# Patient Record
Sex: MALE | Race: BLACK OR AFRICAN AMERICAN | NOT HISPANIC OR LATINO | Employment: STUDENT | URBAN - METROPOLITAN AREA
[De-identification: names, ages, dates, MRNs, and addresses within clinical notes are randomized per-mention and may not be internally consistent; named-entity substitution may affect disease eponyms.]

---
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
## 2023-02-18 ENCOUNTER — HOSPITAL ENCOUNTER (EMERGENCY)
Facility: HOSPITAL | Age: 18
Discharge: HOME | End: 2023-02-18
Attending: EMERGENCY MEDICINE
Payer: COMMERCIAL

## 2023-02-18 VITALS
TEMPERATURE: 99 F | OXYGEN SATURATION: 98 % | HEART RATE: 62 BPM | DIASTOLIC BLOOD PRESSURE: 56 MMHG | RESPIRATION RATE: 19 BRPM | SYSTOLIC BLOOD PRESSURE: 114 MMHG | WEIGHT: 164 LBS

## 2023-02-18 DIAGNOSIS — R56.9: ICD-10-CM

## 2023-02-18 DIAGNOSIS — R56.9 NEW ONSET SEIZURE: Primary | ICD-10-CM

## 2023-02-18 DIAGNOSIS — R56.9 GENERALIZED SEIZURE: ICD-10-CM

## 2023-02-18 DIAGNOSIS — E87.6 HYPOKALEMIA: ICD-10-CM

## 2023-02-18 DIAGNOSIS — Z72.820: ICD-10-CM

## 2023-02-18 LAB
ALBUMIN SERPL BCP-MCNC: 4.1 G/DL (ref 3.5–5.2)
ALP SERPL-CCNC: 67 U/L (ref 55–135)
ALT SERPL W/O P-5'-P-CCNC: 43 U/L (ref 10–44)
AMPHET+METHAMPHET UR QL: NEGATIVE
ANION GAP SERPL CALC-SCNC: 7 MMOL/L (ref 8–16)
AST SERPL-CCNC: 28 U/L (ref 10–40)
BACTERIA #/AREA URNS HPF: NEGATIVE /HPF
BARBITURATES UR QL SCN>200 NG/ML: NEGATIVE
BASOPHILS # BLD AUTO: 0.04 K/UL (ref 0–0.2)
BASOPHILS NFR BLD: 0.8 % (ref 0–1.9)
BENZODIAZ UR QL SCN>200 NG/ML: NEGATIVE
BILIRUB SERPL-MCNC: 1.1 MG/DL (ref 0.1–1)
BILIRUB UR QL STRIP: NEGATIVE
BUN SERPL-MCNC: <5 MG/DL (ref 6–20)
BZE UR QL SCN: NEGATIVE
CALCIUM SERPL-MCNC: 9.3 MG/DL (ref 8.7–10.5)
CANNABINOIDS UR QL SCN: NEGATIVE
CHLORIDE SERPL-SCNC: 104 MMOL/L (ref 95–110)
CLARITY UR: CLEAR
CO2 SERPL-SCNC: 27 MMOL/L (ref 23–29)
COLOR UR: YELLOW
CREAT SERPL-MCNC: 0.9 MG/DL (ref 0.5–1.4)
CREAT UR-MCNC: 222 MG/DL (ref 23–375)
DIFFERENTIAL METHOD: ABNORMAL
EOSINOPHIL # BLD AUTO: 0.2 K/UL (ref 0–0.5)
EOSINOPHIL NFR BLD: 3.6 % (ref 0–8)
ERYTHROCYTE [DISTWIDTH] IN BLOOD BY AUTOMATED COUNT: 11.3 % (ref 11.5–14.5)
EST. GFR  (NO RACE VARIABLE): >60 ML/MIN/1.73 M^2
GLUCOSE SERPL-MCNC: 109 MG/DL (ref 70–110)
GLUCOSE UR QL STRIP: NEGATIVE
HCT VFR BLD AUTO: 42.6 % (ref 40–54)
HGB BLD-MCNC: 14.6 G/DL (ref 14–18)
HGB UR QL STRIP: ABNORMAL
HYALINE CASTS #/AREA URNS LPF: 0 /LPF
IMM GRANULOCYTES # BLD AUTO: 0.01 K/UL (ref 0–0.04)
IMM GRANULOCYTES NFR BLD AUTO: 0.2 % (ref 0–0.5)
KETONES UR QL STRIP: ABNORMAL
LEUKOCYTE ESTERASE UR QL STRIP: NEGATIVE
LYMPHOCYTES # BLD AUTO: 1.7 K/UL (ref 1–4.8)
LYMPHOCYTES NFR BLD: 31.9 % (ref 18–48)
MAGNESIUM SERPL-MCNC: 2.4 MG/DL (ref 1.6–2.6)
MCH RBC QN AUTO: 32.9 PG (ref 27–31)
MCHC RBC AUTO-ENTMCNC: 34.3 G/DL (ref 32–36)
MCV RBC AUTO: 96 FL (ref 82–98)
MICROSCOPIC COMMENT: ABNORMAL
MONOCYTES # BLD AUTO: 0.5 K/UL (ref 0.3–1)
MONOCYTES NFR BLD: 8.7 % (ref 4–15)
NEUTROPHILS # BLD AUTO: 2.9 K/UL (ref 1.8–7.7)
NEUTROPHILS NFR BLD: 54.8 % (ref 38–73)
NITRITE UR QL STRIP: NEGATIVE
NRBC BLD-RTO: 0 /100 WBC
OPIATES UR QL SCN: NEGATIVE
PCP UR QL SCN>25 NG/ML: NEGATIVE
PH UR STRIP: 6 [PH] (ref 5–8)
PLATELET # BLD AUTO: 223 K/UL (ref 150–450)
PMV BLD AUTO: 10.1 FL (ref 9.2–12.9)
POTASSIUM SERPL-SCNC: 3.1 MMOL/L (ref 3.5–5.1)
PROT SERPL-MCNC: 7.2 G/DL (ref 6–8.4)
PROT UR QL STRIP: ABNORMAL
RBC # BLD AUTO: 4.44 M/UL (ref 4.6–6.2)
RBC #/AREA URNS HPF: 6 /HPF (ref 0–4)
SODIUM SERPL-SCNC: 138 MMOL/L (ref 136–145)
SP GR UR STRIP: 1.02 (ref 1–1.03)
SQUAMOUS #/AREA URNS HPF: 0 /HPF
TOXICOLOGY INFORMATION: NORMAL
URN SPEC COLLECT METH UR: ABNORMAL
UROBILINOGEN UR STRIP-ACNC: ABNORMAL EU/DL
WBC # BLD AUTO: 5.3 K/UL (ref 3.9–12.7)
WBC #/AREA URNS HPF: 1 /HPF (ref 0–5)

## 2023-02-18 PROCEDURE — 81001 URINALYSIS AUTO W/SCOPE: CPT | Mod: 59 | Performed by: EMERGENCY MEDICINE

## 2023-02-18 PROCEDURE — 83735 ASSAY OF MAGNESIUM: CPT | Performed by: EMERGENCY MEDICINE

## 2023-02-18 PROCEDURE — 25000003 PHARM REV CODE 250: Performed by: EMERGENCY MEDICINE

## 2023-02-18 PROCEDURE — 80053 COMPREHEN METABOLIC PANEL: CPT | Performed by: EMERGENCY MEDICINE

## 2023-02-18 PROCEDURE — 80307 DRUG TEST PRSMV CHEM ANLYZR: CPT | Performed by: EMERGENCY MEDICINE

## 2023-02-18 PROCEDURE — 85025 COMPLETE CBC W/AUTO DIFF WBC: CPT | Performed by: EMERGENCY MEDICINE

## 2023-02-18 PROCEDURE — 99284 EMERGENCY DEPT VISIT MOD MDM: CPT | Mod: 25

## 2023-02-18 RX ORDER — POTASSIUM CHLORIDE 20 MEQ/1
40 TABLET, EXTENDED RELEASE ORAL ONCE
Status: COMPLETED | OUTPATIENT
Start: 2023-02-18 | End: 2023-02-18

## 2023-02-18 RX ADMIN — POTASSIUM CHLORIDE 40 MEQ: 1500 TABLET, EXTENDED RELEASE ORAL at 09:02

## 2023-02-18 NOTE — DISCHARGE INSTRUCTIONS
Get adequate sleep.  Avoid antihistamines.  No alcohol.  Make an appointment to see the neurologist as soon as possible next week.  You will probably require an EEG which is a brain wave test.  If a seizure recurs return to the hospital immediately

## 2023-02-18 NOTE — ED PROVIDER NOTES
Encounter Date: 2/18/2023       History     Chief Complaint   Patient presents with    Seizures     Patient presents emergency department with reported seizure activity at home patient's mother reports that she heard a thump went to investigate found patient on floor of his room with seizure activity he was postictal after the seizure which mother reports lasted between 3 and 4 minutes she describes generalized tonic-clonic activity EMS arrival patient was postictal his mentation cleared in route to the emergency department after arrival emergency department states he is begun to have somewhat of a headache is not severe prior to onset of the symptoms there is no reported illness or injury no medications he is had no recent fever chills no nausea vomiting or diarrhea he has Vyvanse this is a medication with family reports he is taking no current medications      Review of patient's allergies indicates:  No Known Allergies  Past Medical History:   Diagnosis Date    ADHD (attention deficit hyperactivity disorder)      No past surgical history on file.  No family history on file.  Social History     Tobacco Use    Smoking status: Never   Substance Use Topics    Alcohol use: No    Drug use: No     Review of Systems   Constitutional:  Negative for chills and fever.   HENT:  Negative for congestion, ear pain and sore throat.    Respiratory:  Negative for cough and shortness of breath.    Cardiovascular:  Negative for chest pain and palpitations.   Gastrointestinal:  Negative for abdominal pain, diarrhea, nausea and vomiting.   Genitourinary:  Negative for dysuria.   Musculoskeletal:  Negative for neck pain.   Skin:  Negative for rash.   Neurological:  Positive for seizures and headaches. Negative for tremors, syncope, speech difficulty and weakness.   All other systems reviewed and are negative.    Physical Exam     Initial Vitals [02/18/23 0627]   BP Pulse Resp Temp SpO2   132/80 91 19 98.4 °F (36.9 °C) 98 %      MAP        --         Physical Exam    Constitutional: He appears well-developed and well-nourished. No distress.   HENT:   Head: Normocephalic and atraumatic.   Right Ear: External ear normal.   Left Ear: External ear normal.   Mouth/Throat: Oropharynx is clear and moist.   Eyes: EOM are normal. Pupils are equal, round, and reactive to light.   Neck: Neck supple.   Normal range of motion.  Cardiovascular:  Normal rate, regular rhythm, S1 normal, S2 normal, normal heart sounds and intact distal pulses.           Pulmonary/Chest: Breath sounds normal.   Abdominal: Abdomen is soft. Bowel sounds are normal. There is no abdominal tenderness.   Musculoskeletal:         General: Normal range of motion.      Cervical back: Normal range of motion and neck supple.     Neurological: He is alert and oriented to person, place, and time. He has normal strength. No cranial nerve deficit. GCS score is 15. GCS eye subscore is 4. GCS verbal subscore is 5. GCS motor subscore is 6.   Skin: Skin is warm and dry. Capillary refill takes less than 2 seconds. No rash noted.   Psychiatric: He has a normal mood and affect. His behavior is normal.       ED Course   Procedures  Labs Reviewed   CBC W/ AUTO DIFFERENTIAL - Abnormal; Notable for the following components:       Result Value    RBC 4.44 (*)     MCH 32.9 (*)     RDW 11.3 (*)     All other components within normal limits   COMPREHENSIVE METABOLIC PANEL - Abnormal; Notable for the following components:    Potassium 3.1 (*)     BUN <5 (*)     Total Bilirubin 1.1 (*)     Anion Gap 7 (*)     All other components within normal limits   URINALYSIS, REFLEX TO URINE CULTURE - Abnormal; Notable for the following components:    Protein, UA 1+ (*)     Ketones, UA Trace (*)     Occult Blood UA Trace (*)     Urobilinogen, UA 2.0-3.0 (*)     All other components within normal limits    Narrative:     Specimen Source->Urine   URINALYSIS MICROSCOPIC - Abnormal; Notable for the following components:    RBC, UA  6 (*)     Hyaline Casts, UA 0.00 (*)     All other components within normal limits    Narrative:     Specimen Source->Urine   MAGNESIUM   DRUG SCREEN PANEL, URINE EMERGENCY    Narrative:     Specimen Source->Urine          Imaging Results              CT Head Without Contrast (Final result)  Result time 02/18/23 07:45:39      Final result by Rick Mills MD (02/18/23 07:45:39)                   Narrative:    CMS MANDATED QUALITY DATA - CT RADIATION - 436    All CT scans at this facility utilize dose modulation, iterative reconstruction, and/or weight based dosing when appropriate to reduce radiation dose to as low as reasonably achievable.        Reason: Seizure, new-onset, no history of trauma    TECHNIQUE: Head CT without IV contrast.    COMPARISON: None    FINDINGS:    Gray-white differentiation is maintained without hemorrhage, midline shift, or mass effect.    The ventricles and cisterns are maintained.    Calvarium is intact. Mucous retention cyst versus polyp of the right maxillary sinus. Opacified left frontal sinus noted.    IMPRESSION:    No acute intracranial abnormality.  Right maxillary sinus mucus retention cyst versus polyp. Opacified left frontal sinus, possibly reflecting a mucous retention cyst or polyp.        Electronically signed by:  Rick Mills DO  2/18/2023 7:45 AM [facility] Workstation: [id number]                                     Medications   potassium chloride SA CR tablet 40 mEq (40 mEq Oral Given 2/18/23 0917)                Attending Attestation:             Attending ED Notes:   This 17-year-old male whose care was initially managed by Dr. Oh in whose care assumed at 7:10 a.m., this morning was found on the floor in his room having a generalized seizure.  The patient had no prior history of seizures.  There is no history of any drug or alcohol use.  No history any trauma.  No fever or chills.  The patient had no tongue biting or incontinence.  He is not taking any  over-the-counter antihistamines.  The patient did admit to me though that he only slept 2 hours last night.  There is no family history of seizures.  Screening labs reviewed and unremarkable including a CBC and chemistries.  Magnesium and calcium were normal.  Patient's urinalysis was unremarkable.  A CT scan of his head showed some evidence of a mucous retention cyst versus polyp in the right maxillary and left frontal sinus.  When reexamined the patient was awake alert and cooperative.  He denies any headache or any other complaints of pain.  He and his mother counseled that usually after the 1st generalized seizure, anticonvulsants or not begun however if he has another seizure been they will be started.  He is to follow-up with neurology and will undoubtedly require an EEG.  They are counseled to return to the emergency room if a recurrent seizure occurs.  Of note that previously mentioned the patient only had a slightly low potassium of 3.1 for which she is given 40 mEq of potassium in the ED.                 Clinical Impression:   Final diagnoses:  [R56.9] New onset seizure (Primary)  [R56.9] Generalized seizure  [R56.9, Z72.820] Sleep deprivation seizure  [E87.6] Hypokalemia        ED Disposition Condition    Discharge Stable          ED Prescriptions    None       Follow-up Information       Follow up With Specialties Details Why Contact Info    Carri Dickey MD Neurology Call in 5 days To schedule a follow-up appointment. [address] [place] [id number]  [phone]               Luis Fernandes Jr., MD  02/18/23 0879

## 2023-02-22 ENCOUNTER — OFFICE VISIT (OUTPATIENT)
Dept: NEUROLOGY | Facility: CLINIC | Age: 18
End: 2023-02-22
Payer: COMMERCIAL

## 2023-02-22 VITALS
WEIGHT: 167.25 LBS | HEART RATE: 58 BPM | DIASTOLIC BLOOD PRESSURE: 66 MMHG | BODY MASS INDEX: 23.41 KG/M2 | SYSTOLIC BLOOD PRESSURE: 109 MMHG | HEIGHT: 71 IN

## 2023-02-22 DIAGNOSIS — G40.909 SEIZURE DISORDER: Primary | ICD-10-CM

## 2023-02-22 PROBLEM — M41.125 ADOLESCENT IDIOPATHIC SCOLIOSIS OF THORACOLUMBAR SPINE: Status: ACTIVE | Noted: 2018-10-19

## 2023-02-22 PROBLEM — Z98.1 S/P SPINAL FUSION: Status: ACTIVE | Noted: 2018-10-19

## 2023-02-22 PROCEDURE — 3008F PR BODY MASS INDEX (BMI) DOCUMENTED: ICD-10-PCS | Mod: CPTII,S$GLB,, | Performed by: PSYCHIATRY & NEUROLOGY

## 2023-02-22 PROCEDURE — 3008F BODY MASS INDEX DOCD: CPT | Mod: CPTII,S$GLB,, | Performed by: PSYCHIATRY & NEUROLOGY

## 2023-02-22 PROCEDURE — 1160F PR REVIEW ALL MEDS BY PRESCRIBER/CLIN PHARMACIST DOCUMENTED: ICD-10-PCS | Mod: CPTII,S$GLB,, | Performed by: PSYCHIATRY & NEUROLOGY

## 2023-02-22 PROCEDURE — 3074F SYST BP LT 130 MM HG: CPT | Mod: CPTII,S$GLB,, | Performed by: PSYCHIATRY & NEUROLOGY

## 2023-02-22 PROCEDURE — 3074F PR MOST RECENT SYSTOLIC BLOOD PRESSURE < 130 MM HG: ICD-10-PCS | Mod: CPTII,S$GLB,, | Performed by: PSYCHIATRY & NEUROLOGY

## 2023-02-22 PROCEDURE — 1160F RVW MEDS BY RX/DR IN RCRD: CPT | Mod: CPTII,S$GLB,, | Performed by: PSYCHIATRY & NEUROLOGY

## 2023-02-22 PROCEDURE — 99203 PR OFFICE/OUTPT VISIT, NEW, LEVL III, 30-44 MIN: ICD-10-PCS | Mod: S$GLB,,, | Performed by: PSYCHIATRY & NEUROLOGY

## 2023-02-22 PROCEDURE — 1159F MED LIST DOCD IN RCRD: CPT | Mod: CPTII,S$GLB,, | Performed by: PSYCHIATRY & NEUROLOGY

## 2023-02-22 PROCEDURE — 1159F PR MEDICATION LIST DOCUMENTED IN MEDICAL RECORD: ICD-10-PCS | Mod: CPTII,S$GLB,, | Performed by: PSYCHIATRY & NEUROLOGY

## 2023-02-22 PROCEDURE — 99999 PR PBB SHADOW E&M-EST. PATIENT-LVL III: CPT | Mod: PBBFAC,,, | Performed by: PSYCHIATRY & NEUROLOGY

## 2023-02-22 PROCEDURE — 99999 PR PBB SHADOW E&M-EST. PATIENT-LVL III: ICD-10-PCS | Mod: PBBFAC,,, | Performed by: PSYCHIATRY & NEUROLOGY

## 2023-02-22 PROCEDURE — 99203 OFFICE O/P NEW LOW 30 MIN: CPT | Mod: S$GLB,,, | Performed by: PSYCHIATRY & NEUROLOGY

## 2023-02-22 PROCEDURE — 3078F DIAST BP <80 MM HG: CPT | Mod: CPTII,S$GLB,, | Performed by: PSYCHIATRY & NEUROLOGY

## 2023-02-22 PROCEDURE — 3078F PR MOST RECENT DIASTOLIC BLOOD PRESSURE < 80 MM HG: ICD-10-PCS | Mod: CPTII,S$GLB,, | Performed by: PSYCHIATRY & NEUROLOGY

## 2023-02-22 NOTE — LETTER
February 22, 2023      [place] [facility] Neurology  [address]  [place] [address]  Phone: [phone]  Fax: [phone]       Patient: Fernanda Leos   YOB: 2005  Date of Visit: 02/22/2023    To Whom It May Concern:    Fernanda Leos  was at [facility] on 02/22/2023. The patient may return to work/school on 2/23/2023 with no restrictions. If you have any questions or concerns, or if I can be of further assistance, please do not hesitate to contact me.    Sincerely,        Ella Perez MA

## 2023-02-22 NOTE — PROGRESS NOTES
[facility] NEUROLOGY  [facility], [place]    Date: 2/22/23  Patient Name: Fernanda Leos   MRN: [id number]   PCP: Primary Doctor No  Referring Provider: Self, Aaareferral    Chief Complaint: first time seizures   Subjective:      HPI:   Mr. Fernanda Leos is a 17 y.o. male with past history of ADHD and scoliosis (status post corrective surgery, fusion, T4-L4 instrumentation) presenting to discuss 1st ever seizure event.  Patient was at home on 02/18/23 when family member heard loud noise and went into his room.  Patient was observed with limbs extended and strange look on his face.  Foaming at the mouth jerking movements and irregular breathing.  No tongue biting.  No bowel/bladder incontinence. (+) postictal state for 15+ minutes.  Transported by ambulance to emergency department for evaluation where UDS, Mg, CBC, CMP, UA were largely unrevealing.  No further episodes since that time.    No family history of seizures.  No personal history of seizures.  No history of head trauma or neurological infections.  No history of significant childhood illness.    Patient takes no daily medications.  Denied poor sleep, drug/alcohol consumption, or exposure to sick in the 72 hrs before event.    PAST MEDICAL HISTORY:  Past Medical History:   Diagnosis Date    ADHD (attention deficit hyperactivity disorder)        PAST SURGICAL HISTORY:  History reviewed. No pertinent surgical history.    CURRENT MEDS:  No current outpatient medications on file.     No current facility-administered medications for this visit.       ALLERGIES:  Review of patient's allergies indicates:  No Known Allergies    FAMILY HISTORY:  History reviewed. No pertinent family history.    SOCIAL HISTORY:  Social History     Tobacco Use    Smoking status: Never   Substance Use Topics    Alcohol use: No    Drug use: No       Review of Systems:  Gen: no fever, no chills, no generalized feeling of weakness   HEENT: no double vision, no  "blurred vision, no eye pain, no eye exudates. no nasal congestion,   no traumatic injury of head, no neck pain, no neck stiffness. no photophobia or phonophobia at this time. ?    Heart: no chest pain, no SOB    Lungs: no SOB, no cough    MSK: no weakness of legs, intact ROM    ABD: no abd pain, no N/V/D/C, no difficulty with defecation.    Extremities: No leg pain, no edema.       Objective:     Vitals:    02/22/23 1006   BP: 109/66   BP Location: Left arm   Patient Position: Sitting   Pulse: (!) 58   Weight: 75.8 kg (167 lb 3.5 oz)   Height: 5' 11" (1.803 m)       General: male in NAD, alert and awake, Aox3, well groomed. ?    ? ?    HEENT: Head is NC/AT EOMI, pupil size: 4 mm B/L, no nystagmus noted; hearing grossly intact b/l. Mucous membrane moist, uvula midline, no pharyngeal erythema, exudates or discharges.      Neck: Supple. no nuchal rigidity.      Cardiovascular: well perfused, no cyanosis        Respiratory: Symmetric chest rise noted       Musculoskeletal: Muscle tone noted to be adequate for patient age, muscle mass is WNL. No spontaneous movements or fasciculations noted during this examination.       Extremities: No pedal edema or calf tenderness. No cogwheel rigidity noted on B/L UE extremities.       Neurological Examination.    Mental status: AA&O x3; Affect/mood is euthymic/congruent; no aphasia noted during examination. Patient answers simple questions appropriately & follows simple commands; no dysarthria or expressive aphasia; no harris-neglect or extinction. Vocabulary/word finding: excellent.       Cranial Nerves: II-XII grossly intact. visual fields intact to confrontation, EOMI, no nystagmus, PERRLA,?facial muscles symmetric and no facial droop noted, patient hearing is grossly intact b/l, ?facial sensation to sharp and light touch grossly intact B/L. Patient smiles, frowns, closes eyes ?forcefully uneventfully. Uvula midline and no difficulty with pronunciation. No SCM/trapezius/muscle of " "mastication weakness noted B/L. Patient has adequate control of tongue and may protrude it and move it adequately.       Muscle Function: Tone WNL and Muscle bulk WNL. Left elbow flexors/extensors (5/5), Left shoulder (5/5); left Finger flexor/extensors (5/5). Right elbow flexors/extensors (5/5). Right shoulder abduction/flexion (5/5), Right finger flexors/extensors (5/5). Left LE hip flexion/extension (5/5), Left Knee flexion/extension (5/5) and Left ankle flexion/extension/Eversion/inversion (5/5). Right LE hip flexion/extension (5/5), Right Knee flexion/extension (5/5) and ankle flexion/extension/Eversion/inversion (5/5).       Sensory: ?Pain/sharp, proprioception, and light touch are grossly intact in bilateral upper and lower extremities, face, and trunk.       Reflexes: Left and Right biceps, triceps, brachioradialis are 1+/4. patellar 2+/4 on Left and 2+/4 on Right, B/L Achilles 2+/4; Babinskis were down going B/L      Coordination: no dysmetria (finger to nose negative)     Gait: adequate casual gait with stride length and arm swing WNL. Tandem gait and walking on toes and heels are WNL     OTHER:  02/18/2023 CT head without contrast:  FINDINGS:  Gray-white differentiation is maintained without hemorrhage, midline shift, or mass effect.  The ventricles and cisterns are maintained.  Calvarium is intact. Mucous retention cyst versus polyp of the right maxillary sinus. Opacified left frontal sinus noted.  IMPRESSION:  No acute intracranial abnormality.  Right maxillary sinus mucus retention cyst versus polyp. Opacified left frontal sinus, possibly reflecting a mucous retention cyst or polyp."    Assessment:   Fernanda Leos is a 17 y.o. male presenting to discuss 1st ever seizure event.  Patient was described as suffering with a generalized tonic clonic seizure event followed by typical postictal confusion with no identifiable precipitating factors.  We had an extensive conversation regarding benefits, " risks, side effects, and other issues related to antiepileptics.  As this is a 1st ever event with normal exam and no concerning risk factors, we will defer antiepileptics for now.  We will initiate MRI brain with epilepsy protocol and EEG for further case definition.  Counseled extensively to reach out to our clinic immediately if further episodes occur or if other symptoms come to light.    Plan:     Problem List Items Addressed This Visit    None  Visit Diagnoses       Seizure disorder    -  Primary    Relevant Orders    MRI Brain Epilepsy Without Contrast    EEG,w/awake & drowsy record          - MRI brain with epilepsy protocol   - EEG   - no antiepileptic for now given 1st ever seizure event with no identifiable risk factors   - Seizure safety discussed extensively including avoidance of risky situations, large bodies of water, swimming alone, baths. We also discussed avoiding driving or operating other heavy machinery for 6 months after a breakthrough event in the [facility].   - Advised the patient to avoid seizure provoking behaviors including excessive alcohol consumption, sleep deprivation, and medication noncompliance.   - counseled that we would reach out via phone or chart message regarding results as they become available.  If no further breakthrough events or abnormal findings on testing, we will plan to follow up routinely in 6 months.    I spent a total of 30 minutes on the day of the visit. This includes face to face time and non-face to face time preparing to see the patient (eg, review of tests), obtaining and/or reviewing separately obtained history, documenting clinical information in the electronic or other health record, independently interpreting results and communicating results to the patient/family/caregiver, or care coordinator.    A dictation device was used to produce this document. Use of such devices sometimes results in grammatical errors or replacement of words that sound  similarly.    Paul Narayanan, DO

## 2023-03-02 ENCOUNTER — HOSPITAL ENCOUNTER (OUTPATIENT)
Dept: RADIOLOGY | Facility: HOSPITAL | Age: 18
Discharge: HOME | End: 2023-03-02
Attending: PSYCHIATRY & NEUROLOGY
Payer: COMMERCIAL

## 2023-03-02 ENCOUNTER — TELEPHONE (OUTPATIENT)
Dept: NEUROLOGY | Facility: CLINIC | Age: 18
End: 2023-03-02
Payer: MEDICAID

## 2023-03-02 DIAGNOSIS — G40.909 SEIZURE DISORDER: ICD-10-CM

## 2023-03-02 PROCEDURE — 70551 MRI BRAIN STEM W/O DYE: CPT | Mod: 26,,, | Performed by: RADIOLOGY

## 2023-03-02 PROCEDURE — 70551 MRI BRAIN STEM W/O DYE: CPT | Mod: TC

## 2023-03-02 PROCEDURE — 70551 MRI BRAIN EPILEPSY WITHOUT CONTRAST: ICD-10-PCS | Mod: 26,,, | Performed by: RADIOLOGY

## 2023-03-07 ENCOUNTER — HOSPITAL ENCOUNTER (OUTPATIENT)
Facility: HOSPITAL | Age: 18
Discharge: HOME | End: 2023-03-08
Attending: EMERGENCY MEDICINE | Admitting: HOSPITALIST
Payer: MEDICAID

## 2023-03-07 DIAGNOSIS — R07.9 CHEST PAIN: ICD-10-CM

## 2023-03-07 DIAGNOSIS — R56.9 SEIZURE: Primary | ICD-10-CM

## 2023-03-07 LAB
ALBUMIN SERPL BCP-MCNC: 4.2 G/DL (ref 3.5–5.2)
ALP SERPL-CCNC: 61 U/L (ref 55–135)
ALT SERPL W/O P-5'-P-CCNC: 47 U/L (ref 10–44)
AMPHET+METHAMPHET UR QL: NEGATIVE
ANION GAP SERPL CALC-SCNC: 7 MMOL/L (ref 8–16)
AST SERPL-CCNC: 28 U/L (ref 10–40)
BACTERIA #/AREA URNS HPF: NEGATIVE /HPF
BARBITURATES UR QL SCN>200 NG/ML: NEGATIVE
BASOPHILS # BLD AUTO: 0.04 K/UL (ref 0–0.2)
BASOPHILS NFR BLD: 0.7 % (ref 0–1.9)
BENZODIAZ UR QL SCN>200 NG/ML: NEGATIVE
BILIRUB SERPL-MCNC: 1.1 MG/DL (ref 0.1–1)
BILIRUB UR QL STRIP: NEGATIVE
BUN SERPL-MCNC: 11 MG/DL (ref 6–20)
BZE UR QL SCN: NEGATIVE
CALCIUM SERPL-MCNC: 9.2 MG/DL (ref 8.7–10.5)
CANNABINOIDS UR QL SCN: NEGATIVE
CHLORIDE SERPL-SCNC: 102 MMOL/L (ref 95–110)
CK SERPL-CCNC: 567 U/L (ref 20–200)
CLARITY UR: CLEAR
CO2 SERPL-SCNC: 26 MMOL/L (ref 23–29)
COLOR UR: YELLOW
CREAT SERPL-MCNC: 1 MG/DL (ref 0.5–1.4)
CREAT UR-MCNC: 145 MG/DL (ref 23–375)
DIFFERENTIAL METHOD: ABNORMAL
EOSINOPHIL # BLD AUTO: 0.3 K/UL (ref 0–0.5)
EOSINOPHIL NFR BLD: 5.3 % (ref 0–8)
ERYTHROCYTE [DISTWIDTH] IN BLOOD BY AUTOMATED COUNT: 11.5 % (ref 11.5–14.5)
EST. GFR  (NO RACE VARIABLE): >60 ML/MIN/1.73 M^2
GLUCOSE SERPL-MCNC: 96 MG/DL (ref 70–110)
GLUCOSE UR QL STRIP: NEGATIVE
HCT VFR BLD AUTO: 43.2 % (ref 40–54)
HGB BLD-MCNC: 14.5 G/DL (ref 14–18)
HGB UR QL STRIP: ABNORMAL
HYALINE CASTS #/AREA URNS LPF: 0 /LPF
IMM GRANULOCYTES # BLD AUTO: 0.02 K/UL (ref 0–0.04)
IMM GRANULOCYTES NFR BLD AUTO: 0.3 % (ref 0–0.5)
KETONES UR QL STRIP: ABNORMAL
LEUKOCYTE ESTERASE UR QL STRIP: NEGATIVE
LYMPHOCYTES # BLD AUTO: 1.6 K/UL (ref 1–4.8)
LYMPHOCYTES NFR BLD: 26.6 % (ref 18–48)
MCH RBC QN AUTO: 32.5 PG (ref 27–31)
MCHC RBC AUTO-ENTMCNC: 33.6 G/DL (ref 32–36)
MCV RBC AUTO: 97 FL (ref 82–98)
MICROSCOPIC COMMENT: NORMAL
MONOCYTES # BLD AUTO: 0.4 K/UL (ref 0.3–1)
MONOCYTES NFR BLD: 7.1 % (ref 4–15)
NEUTROPHILS # BLD AUTO: 3.6 K/UL (ref 1.8–7.7)
NEUTROPHILS NFR BLD: 60 % (ref 38–73)
NITRITE UR QL STRIP: NEGATIVE
NRBC BLD-RTO: 0 /100 WBC
OPIATES UR QL SCN: NEGATIVE
PCP UR QL SCN>25 NG/ML: NEGATIVE
PH UR STRIP: 6 [PH] (ref 5–8)
PLATELET # BLD AUTO: 272 K/UL (ref 150–450)
PMV BLD AUTO: 10.1 FL (ref 9.2–12.9)
POTASSIUM SERPL-SCNC: 3.6 MMOL/L (ref 3.5–5.1)
PROT SERPL-MCNC: 7 G/DL (ref 6–8.4)
PROT UR QL STRIP: ABNORMAL
RBC # BLD AUTO: 4.46 M/UL (ref 4.6–6.2)
RBC #/AREA URNS HPF: 2 /HPF (ref 0–4)
SODIUM SERPL-SCNC: 135 MMOL/L (ref 136–145)
SP GR UR STRIP: 1.02 (ref 1–1.03)
SQUAMOUS #/AREA URNS HPF: 0 /HPF
TOXICOLOGY INFORMATION: NORMAL
TSH SERPL DL<=0.005 MIU/L-ACNC: 1.43 UIU/ML (ref 0.34–5.6)
URN SPEC COLLECT METH UR: ABNORMAL
UROBILINOGEN UR STRIP-ACNC: NEGATIVE EU/DL
WBC # BLD AUTO: 6.06 K/UL (ref 3.9–12.7)
WBC #/AREA URNS HPF: 1 /HPF (ref 0–5)

## 2023-03-07 PROCEDURE — G0378 HOSPITAL OBSERVATION PER HR: HCPCS

## 2023-03-07 PROCEDURE — 80053 COMPREHEN METABOLIC PANEL: CPT | Performed by: EMERGENCY MEDICINE

## 2023-03-07 PROCEDURE — 93010 EKG 12-LEAD: ICD-10-PCS | Mod: ,,, | Performed by: INTERNAL MEDICINE

## 2023-03-07 PROCEDURE — 95819 EEG AWAKE AND ASLEEP: CPT

## 2023-03-07 PROCEDURE — 93005 ELECTROCARDIOGRAM TRACING: CPT | Performed by: INTERNAL MEDICINE

## 2023-03-07 PROCEDURE — 84443 ASSAY THYROID STIM HORMONE: CPT | Performed by: EMERGENCY MEDICINE

## 2023-03-07 PROCEDURE — 25000003 PHARM REV CODE 250: Performed by: EMERGENCY MEDICINE

## 2023-03-07 PROCEDURE — 80307 DRUG TEST PRSMV CHEM ANLYZR: CPT | Performed by: EMERGENCY MEDICINE

## 2023-03-07 PROCEDURE — 93010 ELECTROCARDIOGRAM REPORT: CPT | Mod: ,,, | Performed by: INTERNAL MEDICINE

## 2023-03-07 PROCEDURE — 82550 ASSAY OF CK (CPK): CPT | Performed by: EMERGENCY MEDICINE

## 2023-03-07 PROCEDURE — 63600175 PHARM REV CODE 636 W HCPCS: Performed by: HOSPITALIST

## 2023-03-07 PROCEDURE — 96365 THER/PROPH/DIAG IV INF INIT: CPT

## 2023-03-07 PROCEDURE — 81001 URINALYSIS AUTO W/SCOPE: CPT | Mod: 59 | Performed by: EMERGENCY MEDICINE

## 2023-03-07 PROCEDURE — 99285 EMERGENCY DEPT VISIT HI MDM: CPT | Mod: 25

## 2023-03-07 PROCEDURE — 85025 COMPLETE CBC W/AUTO DIFF WBC: CPT | Performed by: EMERGENCY MEDICINE

## 2023-03-07 PROCEDURE — 36415 COLL VENOUS BLD VENIPUNCTURE: CPT | Performed by: EMERGENCY MEDICINE

## 2023-03-07 RX ORDER — ONDANSETRON 2 MG/ML
4 INJECTION INTRAMUSCULAR; INTRAVENOUS EVERY 8 HOURS PRN
Status: DISCONTINUED | OUTPATIENT
Start: 2023-03-07 | End: 2023-03-08 | Stop reason: HOSPADM

## 2023-03-07 RX ORDER — LANOLIN ALCOHOL/MO/W.PET/CERES
800 CREAM (GRAM) TOPICAL
Status: DISCONTINUED | OUTPATIENT
Start: 2023-03-07 | End: 2023-03-08 | Stop reason: HOSPADM

## 2023-03-07 RX ORDER — GLUCAGON 1 MG
1 KIT INJECTION
Status: DISCONTINUED | OUTPATIENT
Start: 2023-03-07 | End: 2023-03-08 | Stop reason: HOSPADM

## 2023-03-07 RX ORDER — ACETAMINOPHEN 325 MG/1
650 TABLET ORAL EVERY 4 HOURS PRN
Status: DISCONTINUED | OUTPATIENT
Start: 2023-03-07 | End: 2023-03-08 | Stop reason: HOSPADM

## 2023-03-07 RX ORDER — NALOXONE HCL 0.4 MG/ML
0.02 VIAL (ML) INJECTION
Status: DISCONTINUED | OUTPATIENT
Start: 2023-03-07 | End: 2023-03-08 | Stop reason: HOSPADM

## 2023-03-07 RX ORDER — SODIUM,POTASSIUM PHOSPHATES 280-250MG
2 POWDER IN PACKET (EA) ORAL
Status: DISCONTINUED | OUTPATIENT
Start: 2023-03-07 | End: 2023-03-08 | Stop reason: HOSPADM

## 2023-03-07 RX ORDER — LEVETIRACETAM 500 MG/1
500 TABLET ORAL
Status: COMPLETED | OUTPATIENT
Start: 2023-03-07 | End: 2023-03-07

## 2023-03-07 RX ORDER — IBUPROFEN 200 MG
16 TABLET ORAL
Status: DISCONTINUED | OUTPATIENT
Start: 2023-03-07 | End: 2023-03-08 | Stop reason: HOSPADM

## 2023-03-07 RX ORDER — TALC
6 POWDER (GRAM) TOPICAL NIGHTLY PRN
Status: DISCONTINUED | OUTPATIENT
Start: 2023-03-07 | End: 2023-03-08 | Stop reason: HOSPADM

## 2023-03-07 RX ORDER — ACETAMINOPHEN 325 MG/1
650 TABLET ORAL EVERY 8 HOURS PRN
Status: DISCONTINUED | OUTPATIENT
Start: 2023-03-07 | End: 2023-03-08 | Stop reason: HOSPADM

## 2023-03-07 RX ORDER — LORAZEPAM 2 MG/ML
2 INJECTION INTRAMUSCULAR
Status: DISCONTINUED | OUTPATIENT
Start: 2023-03-07 | End: 2023-03-08 | Stop reason: HOSPADM

## 2023-03-07 RX ORDER — LEVETIRACETAM 5 MG/ML
500 INJECTION INTRAVASCULAR EVERY 12 HOURS
Status: DISCONTINUED | OUTPATIENT
Start: 2023-03-07 | End: 2023-03-08

## 2023-03-07 RX ORDER — POLYETHYLENE GLYCOL 3350 17 G/17G
17 POWDER, FOR SOLUTION ORAL 2 TIMES DAILY PRN
Status: DISCONTINUED | OUTPATIENT
Start: 2023-03-07 | End: 2023-03-08 | Stop reason: HOSPADM

## 2023-03-07 RX ORDER — SODIUM CHLORIDE 0.9 % (FLUSH) 0.9 %
10 SYRINGE (ML) INJECTION EVERY 12 HOURS PRN
Status: DISCONTINUED | OUTPATIENT
Start: 2023-03-07 | End: 2023-03-08 | Stop reason: HOSPADM

## 2023-03-07 RX ORDER — IBUPROFEN 200 MG
24 TABLET ORAL
Status: DISCONTINUED | OUTPATIENT
Start: 2023-03-07 | End: 2023-03-08 | Stop reason: HOSPADM

## 2023-03-07 RX ORDER — SIMETHICONE 80 MG
1 TABLET,CHEWABLE ORAL 4 TIMES DAILY PRN
Status: DISCONTINUED | OUTPATIENT
Start: 2023-03-07 | End: 2023-03-08 | Stop reason: HOSPADM

## 2023-03-07 RX ORDER — POLYETHYLENE GLYCOL 3350 17 G/17G
17 POWDER, FOR SOLUTION ORAL 2 TIMES DAILY
Status: DISCONTINUED | OUTPATIENT
Start: 2023-03-07 | End: 2023-03-07

## 2023-03-07 RX ADMIN — LEVETIRACETAM 500 MG: 5 INJECTION INTRAVENOUS at 08:03

## 2023-03-07 RX ADMIN — LEVETIRACETAM 500 MG: 500 TABLET, FILM COATED ORAL at 11:03

## 2023-03-07 NOTE — H&P
"[facility]  History & Physical    Patient Name: Fernanda Leos  MRN: [id number]  Patient Class: OP- Observation  Admission Date: 3/7/2023  Attending Physician: Lesly Salas MD   Primary Care Provider: Primary Doctor No         Patient information was obtained from patient, past medical records and ER records.     Subjective:     Principal Problem:Seizure    Chief Complaint:   Chief Complaint   Patient presents with    Seizures     Mom states pt had a seizure 15min ago that lasted approx  2 min. Mom states pt had a seizure 2weeks ago but has never had them before. Described as grand mal seizures. Pt A/Ox4        HPI: 17-year-old  gentleman with no significant prior history who recently had episode of seizure on 02/18 requiring ER visit came back with chief complaint of another episode of seizure today morning.  Upon further asking patient mentioned that he was in his usual state of health until today morning when family noticed tonic-clonic seizure however he denies any tongue biting or frothing from mouth.  He recently underwent CT head, MRI in his neurologist was planning EEG.  Otherwise denies any fever, chills, headache, dizziness, chest pain, palpitations, nausea, vomiting, bladder or bowel symptoms, denies taking any over-the-counter supplements, herbs.  Denies any smoking, excessive alcohol consumption or recreational drug use.  He does admit stress in the school due to "Grades".     Past medical history:  Negative except mentioned in HPI  Family history:  Denies any family history of seizure  Social history:  Denies any smoking, alcohol or recreational drug use  Surgical history:  Spine surgery      Past Medical History:   Diagnosis Date    ADHD (attention deficit hyperactivity disorder)        No past surgical history on file.    Review of patient's allergies indicates:  No Known Allergies    No current facility-administered medications on file prior to " encounter.     No current outpatient medications on file prior to encounter.     Family History    None       Tobacco Use    Smoking status: Never    Smokeless tobacco: Not on file   Substance and Sexual Activity    Alcohol use: No    Drug use: No    Sexual activity: Never     Review of Systems   Constitutional:  Negative for activity change and appetite change.   HENT:  Negative for congestion and sore throat.    Eyes:  Negative for discharge and visual disturbance.   Respiratory:  Negative for cough and chest tightness.    Cardiovascular:  Negative for chest pain and leg swelling.   Gastrointestinal:  Negative for abdominal pain and nausea.   Genitourinary:  Negative for dysuria and hematuria.   Musculoskeletal:  Negative for myalgias.   Skin:  Negative for pallor and rash.   Neurological:  Positive for seizures. Negative for dizziness and weakness.   Psychiatric/Behavioral:  Negative for behavioral problems. The patient is not nervous/anxious.    All other systems reviewed and are negative.  Objective:     Vital Signs (Most Recent):  Temp: 98.1 °F (36.7 °C) (03/07/23 0609)  Pulse: 63 (03/07/23 1159)  Resp: 16 (03/07/23 0609)  BP: (!) 108/58 (03/07/23 1159)  SpO2: 96 % (03/07/23 1159)   Vital Signs (24h Range):  Temp:  [98.1 °F (36.7 °C)] 98.1 °F (36.7 °C)  Pulse:  [63-91] 63  Resp:  [16] 16  SpO2:  [95 %-97 %] 96 %  BP: ()/(57-73) 108/58     Weight: 75.3 kg (166 lb)  Body mass index is 23.15 kg/m².    Physical Exam  Vitals and nursing note reviewed.   Constitutional:       Appearance: He is well-developed.   HENT:      Head: Atraumatic.   Neck:      Vascular: No JVD.   Cardiovascular:      Rate and Rhythm: Normal rate and regular rhythm.      Heart sounds: Normal heart sounds. No murmur heard.    No gallop.   Pulmonary:      Effort: No respiratory distress.      Breath sounds: Normal breath sounds. No wheezing.   Abdominal:      General: Bowel sounds are normal. There is no distension.      Palpations:  Abdomen is soft.      Tenderness: There is no guarding or rebound.   Musculoskeletal:      Cervical back: Neck supple.   Lymphadenopathy:      Cervical: No cervical adenopathy.   Skin:     General: Skin is warm.      Capillary Refill: Capillary refill takes less than 2 seconds.      Findings: No rash.   Neurological:      Mental Status: He is alert and oriented to person, place, and time.      Cranial Nerves: No cranial nerve deficit.   Psychiatric:         Behavior: Behavior normal.           Significant Labs: All pertinent labs within the past 24 hours have been reviewed.    Significant Imaging: I have reviewed all pertinent imaging results/findings within the past 24 hours.    Assessment/Plan:     17-year-old without any prior history presented with an episode of seizure, last episode of seizure was on 02/18 otherwise does not carry diagnosis of seizure disorder/epilepsy    Active Hospital Problems    Diagnosis  POA    *Seizure [R56.9]  Yes      Resolved Hospital Problems   No resolved problems to display.       Plan:  Admit to med surge-observation  Patient recently had seizure on 02/18.  CT head and MRI was negative recently, recently saw neurologist and was not placed on antiepileptics due to his 1st episode and normal neuro exam  ED provider discussed case with our Neurology team who recommended admission to hospital medicine and EEG  EEG ordered  Neurology consulted  Reviewed recent CT head as well as MRI  Received 500 mg IV Keppra in the ED, will continue 500 b.i.d.  Fall precautions, seizure precautions  P.r.n. Ativan for seizure  Further management as per clinical course and neurologist's  Recommendations  No family available at bedside during my interview    VTE Risk Mitigation (From admission, onward)    None             Lesly Salas MD  Department of Hospital Medicine   [facility]

## 2023-03-07 NOTE — CONSULTS
"[facility]  Department of Neurology  Neurology Consultation Note        PATIENT NAME: Fernanda Leos  MRN: [id number]  CSN: [id number]      TODAY'S DATE: 03/07/2023  ADMIT DATE: 3/7/2023                            CONSULTING PROVIDER: Pushpa Carroll NP  CONSULT REQUESTED BY: Lesly Salas MD      Reason for consult: Seizures       History obtained from chart review and the patient.    HPI per EMR: Fernanda Leos is a 17 y.o. male with no significant prior history who recently had episode of seizure on 02/18 requiring ER visit came back with chief complaint of another episode of seizure today morning.  Upon further asking patient mentioned that he was in his usual state of health until today morning when family noticed tonic-clonic seizure however he denies any tongue biting or frothing from mouth. He recently underwent CT head, MRI in his neurologist was planning EEG. Otherwise denies any fever, chills, headache, dizziness, chest pain, palpitations, nausea, vomiting, bladder or bowel symptoms, denies taking any over-the-counter supplements, herbs. Denies any smoking, excessive alcohol consumption or recreational drug use.  He does admit stress in the school due to "Grades".     Neurology consult: Pt seen and examined by me. In bed. Alert and oriented x3. Presented to the ED for complaints of seizure like activity. Described as convulsions that lasted for an unknown amount of time. Denies tongue biting and/ or incontinence. Post ictal state describes as fatigue and mild confusion/ brain fog. This is his second event. Currently being worked up in the clinic for seizures. Family hx of seizures (sister). Denies other events of seizure like activity including staring episodes.     PREVIOUS MEDICAL HISTORY:  Past Medical History:   Diagnosis Date    ADHD (attention deficit hyperactivity disorder)      PREVIOUS SURGICAL HISTORY:  No past surgical history on file.  FAMILY MEDICAL HISTORY:  No family " history on file.  SOCIAL HISTORY:  Social History     Tobacco Use    Smoking status: Never   Substance Use Topics    Alcohol use: No    Drug use: No     ALLERGIES:  Review of patient's allergies indicates:  No Known Allergies  HOME MEDICATIONS:  Prior to Admission medications    Not on File     CURRENT SCHEDULED MEDICATIONS:   levetiracetam IV  500 mg Intravenous Q12H    polyethylene glycol  17 g Oral BID     CURRENT INFUSIONS:    CURRENT PRN MEDICATIONS:  acetaminophen, acetaminophen, glucagon (human recombinant), glucose, glucose, lorazepam, magnesium oxide, magnesium oxide, melatonin, naloxone, ondansetron, potassium bicarbonate, potassium bicarbonate, potassium bicarbonate, potassium, sodium phosphates, potassium, sodium phosphates, potassium, sodium phosphates, simethicone, sodium chloride 0.9%    REVIEW OF SYSTEMS:  Please refer to the HPI for all pertinent positive and negative findings. A comprehensive review of all other systems was negative.       PHYSICAL EXAM:  Patient Vitals for the past 24 hrs:   BP Temp Temp src Pulse Resp SpO2 Weight   03/07/23 1646 125/77 98 °F (36.7 °C) Oral 80 18 (!) 3 % --   03/07/23 1246 132/75 98.2 °F (36.8 °C) Oral 79 18 97 % --   03/07/23 1159 (!) 108/58 -- -- 63 -- 96 % --   03/07/23 1129 106/60 -- -- 86 -- 97 % --   03/07/23 1059 (!) 97/57 -- -- 63 -- 95 % --   03/07/23 1030 119/70 -- -- 75 -- 97 % --   03/07/23 1000 110/68 -- -- 69 -- 95 % --   03/07/23 0931 104/60 -- -- 67 -- 97 % --   03/07/23 0759 119/69 -- -- 82 -- 97 % --   03/07/23 0659 118/65 -- -- 73 -- 95 % --   03/07/23 0648 (!) 113/57 -- -- 77 -- 95 % --   03/07/23 0609 121/73 98.1 °F (36.7 °C) Oral 91 16 95 % 75.3 kg (166 lb)       GENERAL APPEARANCE: Drowsy, well-developed, well-nourished male in no acute distress.  HEENT: Normocephalic and atraumatic. PERRL. Oropharynx unremarkable.  PULM: Normal respiratory effort. No accessory muscle use.  CV: RRR.  ABDOMEN: Soft, nontender.  EXTREMITIES: No obvious signs  of vascular compromise. Pulses present. No cyanosis, clubbing or edema.  SKIN: Clear; no rashes, lesions or skin breaks in exposed areas.    NEURO:  MENTAL STATUS: Patient awake and oriented to time, place, and person, recent/remote memory normal, attention span/concentration normal, speech fluent without paraphasic errors, good comprehension with appropriate thought content, and fund of knowledge appropriate for patient's level of education.  Affect euthymic.    CRANIAL NERVES:  CN I: Not tested.  CN II: Fundoscopic exam deferred.  CN III, IV, VI: Pupils equal, round and reactive to light.  Extraocular movements full and intact.  CN V: Facial sensation normal.  CN VII: Facial asymmetry absent.  CN VIII: Hearing grossly normal and equal bilaterally.  No skew deviation or pathologic nystagmus.  CN IX, X: Palate elevates symmetrically. Speech/articulation is clear without dysarthria.  CN XI: Shoulder shrug and chin rotation equal with good strength.  CN XII: Tongue protrusion midline.    MOTOR:  Bulk normal. Tone normal and symmetric throughout.  Abnormal movements absent.  Tremor: none present.  Strength  4/5 .    REFLEXES:  DTRs 2+ throughout.  Plantar response not tested.  SENSATION: grossly intact throughout.  COORDINATION: normal finger-to-nose.  STATION: not tested.  GAIT: not tested.      Labs:  Recent Labs   Lab 03/07/23  0713   *   K 3.6      CO2 26   BUN 11   CREATININE 1.0   GLU 96   CALCIUM 9.2     Recent Labs   Lab 03/07/23  0713   WBC 6.06   HGB 14.5   HCT 43.2        Recent Labs   Lab 03/07/23 0713   ALBUMIN 4.2   PROT 7.0   BILITOT 1.1*   ALKPHOS 61   ALT 47*   AST 28     No results found for: PT, PTT, INR  No results found for: CHOLTOT, TRIG, HDL, CHOLHDL, LDL  No results found for: HGBA1C  No results found for: PROTEINCSF, GLUCCSF, WBCCSF    Imaging:  I have reviewed and interpreted the pertinent imaging and lab results.      X-Ray Chest AP Portable  Reason:  Seizure.    FINDINGS:    Portable chest without comparisons show normal cardiomediastinal silhouette.  Lungs are clear. Pulmonary vasculature is normal. No acute osseous abnormality. Dextrocurvature of the thoracic spine with Sharp rods.    IMPRESSION:    No acute cardiopulmonary abnormality.    Electronically signed by:  Rick Mills DO  3/7/2023 8:16 AM CST Workstation: [id number]  CT Head Without Contrast  CMS MANDATED QUALITY DATA - CT RADIATION - 436    All CT scans at this facility utilize dose modulation, iterative reconstruction, and/or weight based dosing when appropriate to reduce radiation dose to as low as reasonably achievable.    Reason: Seizure, new-onset, no history of trauma    TECHNIQUE: Head CT without IV contrast.    COMPARISON: CT brain February 18, 2023    FINDINGS:    Gray-white differentiation is maintained without hemorrhage, midline shift, or mass effect.    The ventricles and cisterns are maintained.    Calvarium is intact. Right maxillary sinus mucus retention cyst versus polyp and mucoperiosteal thickening of the left frontal sinus again noted.    IMPRESSION:    No acute intracranial abnormality.    Electronically signed by:  Rick Mills DO  3/7/2023 8:15 AM CST Workstation: [id number]         ASSESSMENT & PLAN:    Seizures    CTH- No acute intracranial abnormality.  Previous MRI brain wo- Negative noncontrast MRI of the brain with special attention to the mesial temporal lobes    PLAN  - Routine EEG pending   - Continue Keppra 500mg BID for now  - OK with PRN ativan for seizures   - Medication s/e discussed with patient  - Seizure education and precautions discussed with patient  - Seizures precautions while inpatient   - Monitor for 24 hours for seizure events and complete EEG  - Follow up with Neurocare within 2 weeks of discharge       Seizure education discussed with patient .      No driving for now, no swimming alone, no climbing high areas, no operation of heavy  machinery or working with high risk electricity equipment.    Continue to take AEDs as prescribed. If any major side effects from neurological medications go to the ED including mood changes and severe depression.  Patient and/or next of kin informed.  Follow up Neurology in 2 weeks. Call [phone] to make an appointment.    Medication side effects discussed with the patient and/or caregiver.        Thank you kindly for including us in the care of this patient. Please do not hesitate to contact us with any questions.      50 minutes of care time has been spent evaluating with the patient. Time includes chart review not limited to diagnostic imaging, labs, and vitals, patient assessment, discussion with family and nursing, current order evaluations, and new order entries.       Pushpa Carroll NP  Neurology/vascular Neurology  Date of Service: 03/07/2023  4:56 PM    --------------------------------------------------------------------------------------------------------------------------------------------------------------------------------------------------------------------------------------------------------------  Please note: This note was transcribed using voice recognition software. Because of this technology there are often uinintended grammatical, spelling, and other transcription errors. Please disregard these errors.

## 2023-03-07 NOTE — SUBJECTIVE & OBJECTIVE
Past Medical History:   Diagnosis Date    ADHD (attention deficit hyperactivity disorder)        No past surgical history on file.    Review of patient's allergies indicates:  No Known Allergies    No current facility-administered medications on file prior to encounter.     No current outpatient medications on file prior to encounter.     Family History    None       Tobacco Use    Smoking status: Never    Smokeless tobacco: Not on file   Substance and Sexual Activity    Alcohol use: No    Drug use: No    Sexual activity: Never     Review of Systems   Constitutional:  Negative for activity change and appetite change.   HENT:  Negative for congestion and sore throat.    Eyes:  Negative for discharge and visual disturbance.   Respiratory:  Negative for cough and chest tightness.    Cardiovascular:  Negative for chest pain and leg swelling.   Gastrointestinal:  Negative for abdominal pain and nausea.   Genitourinary:  Negative for dysuria and hematuria.   Musculoskeletal:  Negative for myalgias.   Skin:  Negative for pallor and rash.   Neurological:  Positive for seizures. Negative for dizziness and weakness.   Psychiatric/Behavioral:  Negative for behavioral problems. The patient is not nervous/anxious.    All other systems reviewed and are negative.  Objective:     Vital Signs (Most Recent):  Temp: 98.1 °F (36.7 °C) (03/07/23 0609)  Pulse: 63 (03/07/23 1159)  Resp: 16 (03/07/23 0609)  BP: (!) 108/58 (03/07/23 1159)  SpO2: 96 % (03/07/23 1159)   Vital Signs (24h Range):  Temp:  [98.1 °F (36.7 °C)] 98.1 °F (36.7 °C)  Pulse:  [63-91] 63  Resp:  [16] 16  SpO2:  [95 %-97 %] 96 %  BP: ()/(57-73) 108/58     Weight: 75.3 kg (166 lb)  Body mass index is 23.15 kg/m².    Physical Exam  Vitals and nursing note reviewed.   Constitutional:       Appearance: He is well-developed.   HENT:      Head: Atraumatic.   Neck:      Vascular: No JVD.   Cardiovascular:      Rate and Rhythm: Normal rate and regular rhythm.      Heart  sounds: Normal heart sounds. No murmur heard.    No gallop.   Pulmonary:      Effort: No respiratory distress.      Breath sounds: Normal breath sounds. No wheezing.   Abdominal:      General: Bowel sounds are normal. There is no distension.      Palpations: Abdomen is soft.      Tenderness: There is no guarding or rebound.   Musculoskeletal:      Cervical back: Neck supple.   Lymphadenopathy:      Cervical: No cervical adenopathy.   Skin:     General: Skin is warm.      Capillary Refill: Capillary refill takes less than 2 seconds.      Findings: No rash.   Neurological:      Mental Status: He is alert and oriented to person, place, and time.      Cranial Nerves: No cranial nerve deficit.   Psychiatric:         Behavior: Behavior normal.           Significant Labs: All pertinent labs within the past 24 hours have been reviewed.    Significant Imaging: I have reviewed all pertinent imaging results/findings within the past 24 hours.

## 2023-03-07 NOTE — HPI
"17-year-old  gentleman with no significant prior history who recently had episode of seizure on 02/18 requiring ER visit came back with chief complaint of another episode of seizure today morning.  Upon further asking patient mentioned that he was in his usual state of health until today morning when family noticed tonic-clonic seizure however he denies any tongue biting or frothing from mouth.  He recently underwent CT head, MRI in his neurologist was planning EEG.  Otherwise denies any fever, chills, headache, dizziness, chest pain, palpitations, nausea, vomiting, bladder or bowel symptoms, denies taking any over-the-counter supplements, herbs.  Denies any smoking, excessive alcohol consumption or recreational drug use.  He does admit stress in the school due to "Grades".     Past medical history:  Negative except mentioned in HPI  Family history:  Denies any family history of seizure  Social history:  Denies any smoking, alcohol or recreational drug use  Surgical history:  Spine surgery  "

## 2023-03-07 NOTE — LETTER
March 8, 2023         [address]  [facility] [address]  Phone: [phone]  Fax: [phone]       Patient: Fernanda Leos   YOB: 2005  Date of Visit: 03/08/2023    To Whom It May Concern:    aFisal Leos  was at [facility] on 3/7/2023. The patient may return to work/school on 3/9/2023 with activity as tolerated with no driving or swimming restriction. If you have any questions or concerns, or if I can be of further assistance, please do not hesitate to contact me.    Sincerely,    Ioana Gómez RN

## 2023-03-08 VITALS
DIASTOLIC BLOOD PRESSURE: 75 MMHG | WEIGHT: 172.38 LBS | OXYGEN SATURATION: 98 % | HEART RATE: 60 BPM | RESPIRATION RATE: 16 BRPM | SYSTOLIC BLOOD PRESSURE: 122 MMHG | BODY MASS INDEX: 24.13 KG/M2 | HEIGHT: 71 IN | TEMPERATURE: 98 F

## 2023-03-08 LAB
ALBUMIN SERPL BCP-MCNC: 4.4 G/DL (ref 3.5–5.2)
ALP SERPL-CCNC: 73 U/L (ref 55–135)
ALT SERPL W/O P-5'-P-CCNC: 45 U/L (ref 10–44)
ANION GAP SERPL CALC-SCNC: 5 MMOL/L (ref 8–16)
AST SERPL-CCNC: 28 U/L (ref 10–40)
BASOPHILS # BLD AUTO: 0.06 K/UL (ref 0–0.2)
BASOPHILS NFR BLD: 1 % (ref 0–1.9)
BILIRUB SERPL-MCNC: 1 MG/DL (ref 0.1–1)
BUN SERPL-MCNC: 11 MG/DL (ref 6–20)
CALCIUM SERPL-MCNC: 9.5 MG/DL (ref 8.7–10.5)
CHLORIDE SERPL-SCNC: 107 MMOL/L (ref 95–110)
CO2 SERPL-SCNC: 27 MMOL/L (ref 23–29)
CREAT SERPL-MCNC: 0.9 MG/DL (ref 0.5–1.4)
DIFFERENTIAL METHOD: ABNORMAL
EOSINOPHIL # BLD AUTO: 0.5 K/UL (ref 0–0.5)
EOSINOPHIL NFR BLD: 8.2 % (ref 0–8)
ERYTHROCYTE [DISTWIDTH] IN BLOOD BY AUTOMATED COUNT: 11.3 % (ref 11.5–14.5)
EST. GFR  (NO RACE VARIABLE): >60 ML/MIN/1.73 M^2
GLUCOSE SERPL-MCNC: 108 MG/DL (ref 70–110)
HCT VFR BLD AUTO: 43.2 % (ref 40–54)
HGB BLD-MCNC: 14.8 G/DL (ref 14–18)
IMM GRANULOCYTES # BLD AUTO: 0.01 K/UL (ref 0–0.04)
IMM GRANULOCYTES NFR BLD AUTO: 0.2 % (ref 0–0.5)
LYMPHOCYTES # BLD AUTO: 3 K/UL (ref 1–4.8)
LYMPHOCYTES NFR BLD: 52.1 % (ref 18–48)
MAGNESIUM SERPL-MCNC: 2.1 MG/DL (ref 1.6–2.6)
MCH RBC QN AUTO: 33 PG (ref 27–31)
MCHC RBC AUTO-ENTMCNC: 34.3 G/DL (ref 32–36)
MCV RBC AUTO: 96 FL (ref 82–98)
MONOCYTES # BLD AUTO: 0.5 K/UL (ref 0.3–1)
MONOCYTES NFR BLD: 7.9 % (ref 4–15)
NEUTROPHILS # BLD AUTO: 1.8 K/UL (ref 1.8–7.7)
NEUTROPHILS NFR BLD: 30.6 % (ref 38–73)
NRBC BLD-RTO: 0 /100 WBC
PHOSPHATE SERPL-MCNC: 4.2 MG/DL (ref 2.7–4.5)
PLATELET # BLD AUTO: 294 K/UL (ref 150–450)
PMV BLD AUTO: 10 FL (ref 9.2–12.9)
POTASSIUM SERPL-SCNC: 3.8 MMOL/L (ref 3.5–5.1)
PROT SERPL-MCNC: 7.2 G/DL (ref 6–8.4)
RBC # BLD AUTO: 4.49 M/UL (ref 4.6–6.2)
SODIUM SERPL-SCNC: 139 MMOL/L (ref 136–145)
WBC # BLD AUTO: 5.82 K/UL (ref 3.9–12.7)

## 2023-03-08 PROCEDURE — 83735 ASSAY OF MAGNESIUM: CPT | Performed by: HOSPITALIST

## 2023-03-08 PROCEDURE — G0378 HOSPITAL OBSERVATION PER HR: HCPCS

## 2023-03-08 PROCEDURE — 36415 COLL VENOUS BLD VENIPUNCTURE: CPT | Performed by: HOSPITALIST

## 2023-03-08 PROCEDURE — 80053 COMPREHEN METABOLIC PANEL: CPT | Performed by: HOSPITALIST

## 2023-03-08 PROCEDURE — 84100 ASSAY OF PHOSPHORUS: CPT | Performed by: HOSPITALIST

## 2023-03-08 PROCEDURE — 25000003 PHARM REV CODE 250: Performed by: INTERNAL MEDICINE

## 2023-03-08 PROCEDURE — 85025 COMPLETE CBC W/AUTO DIFF WBC: CPT | Performed by: HOSPITALIST

## 2023-03-08 RX ORDER — LEVETIRACETAM 500 MG/1
500 TABLET ORAL 2 TIMES DAILY
Status: DISCONTINUED | OUTPATIENT
Start: 2023-03-08 | End: 2023-03-08 | Stop reason: HOSPADM

## 2023-03-08 RX ORDER — LEVETIRACETAM 500 MG/1
500 TABLET ORAL 2 TIMES DAILY
Qty: 60 TABLET | Refills: 0 | Status: SHIPPED | OUTPATIENT
Start: 2023-03-08 | End: 2023-03-17

## 2023-03-08 RX ADMIN — LEVETIRACETAM 500 MG: 500 TABLET, FILM COATED ORAL at 08:03

## 2023-03-08 NOTE — PLAN OF CARE
Discharge orders and chart reviewed with no further post-acute discharge needs identified at this time.  At this time, patient is cleared for discharge from Case Management standpoint.     Patient to transport home via private vehicle with family.       03/08/23 0918   Final Note   Assessment Type Final Discharge Note   Anticipated Discharge Disposition Home   Hospital Resources/Appts/Education Provided   (patient instructed to search the [employer] website and choose a PCP to establish care with, and schedule a post hospital follow up appointment within 7 to 10 days of discharge.)   Post-Acute Status   Coverage [employer] PPO   Discharge Delays None known at this time

## 2023-03-08 NOTE — PLAN OF CARE
Met with patient at bedside to complete initial assessment. Patient / family reports patient DOES NOT have a living will and NO ONE is medical POA.      [facility]  Initial Discharge Assessment       Primary Care Provider: Primary Doctor No    Admission Diagnosis: Seizure [R56.9]    Admission Date: 3/7/2023  Expected Discharge Date: 3/8/2023    Discharge Barriers Identified: (P) None    Payor: HUMANA / Plan: HUMANA  PPO / Product Type: PPO /     Extended Emergency Contact Information  Primary Emergency Contact: Alexandra Leos  Address: [address] [place] [address] [id number] [place] [address]  Home Phone: [phone]  Mobile Phone: [phone]  Relation: Mother    Discharge Plan A: (P) Home with family  Discharge Plan B: (P) Home with family      [id number] - [place], [place] - [address] AT [facility] [address] & [facility]  [address] [facility] [address]  Phone: [phone] Fax: [phone]    [id number] - [place] - [address] AT [facility] OF [address]  [address] [place] [address]  Phone: [phone] Fax: [phone]      Initial Assessment (most recent)       Adult Discharge Assessment - 03/08/23 0916          Discharge Assessment    Assessment Type Discharge Planning Assessment (P)      Confirmed/corrected address, phone number and insurance Yes (P)      Confirmed Demographics Correct on Facesheet (P)      Source of Information patient (P)      Communicated ELINOR with patient/caregiver Yes (P)      Reason For Admission seizure (P)      People in Home parent(s) (P)      Facility Arrived From: home (P)      Do you expect to return to your current living situation? Yes (P)      Do you have help at home or someone to help you manage your care at home? Yes (P)      Who are your caregiver(s) and their phone number(s)? Alexandra Leos (Mother)   [phone] (Mobile) (P)      Current cognitive status: Alert/Oriented (P)       Equipment Currently Used at Home none (P)      Readmission within 30 days? No (P)      Patient currently being followed by outpatient case management? No (P)      Do you currently have service(s) that help you manage your care at home? No (P)      Do you take prescription medications? No (P)      Do you have prescription coverage? Yes (P)      Coverage [employer] PPO (P)      Who is going to help you get home at discharge? Alexandra Leos (Mother)   [phone] (Mobile) (P)      How do you get to doctors appointments? car, drives self (P)      Are you on dialysis? No (P)      Do you take coumadin? No (P)      Discharge Plan A Home with family (P)      Discharge Plan B Home with family (P)      DME Needed Upon Discharge  none (P)      Discharge Plan discussed with: Patient (P)      Discharge Barriers Identified None (P)         OTHER    Name(s) of People in Home Alexandra Leos (Mother)   [phone] (Mobile) (P)

## 2023-03-08 NOTE — PROCEDURES
REASON FOR STUDY: Seizures  DATE OF STUDY: 3/7/2023  PHYSICIAN REQUESTING/INSTITUTION: Dr. Dotson.      Methods: This 25-minute EEG was performed with electrodes placement according to the 10/20 international system. Video was added.   EEG FINDINGS:  Background activity: The background activity consisted of posterior dominant rhythm of 7 Hz with minimal reactivity.   Drowsiness: Present.  Sleep: Present.  Photic stimulation and hyperventilation were performed with no associated changes. EEG abnormalities: 1- Generalized theta activity 2- Slow posterior dominant rhythm      IMPRESSION: Abnormal routine EEG study indicating a mild encephalopathy. No interictal epileptiform discharges and no seizures were recorded.  --    Alonzo Burnham MD, FAAN.  Board Certified in Neurology, Vascular Neurology, and Brain Injury Medicine           Alonzo Burnham MD. FAAN.  [facility]  [phone]

## 2023-03-08 NOTE — HOSPITAL COURSE
Assumed care of this patient on 3/8/23.  Patient states he was sleeping, family noted seizure-like activity.  Reportedly he had brain fog/postictal state.  This is his second episode of seizure activity.  States he was being evaluated by a neurologist in [place], had CT head, MRI with no acute findings, was not on AED.  Labs on presentation with , likely due to seizure.  Repeat CT head no acute.  EEG with no epileptiform discharges or seizure activity.  He was seen by Neurology in consultation, started on Keppra 500 mg b.i.d. He was placed in the hospital under observation status.  No further seizure activity, back to his baseline mental status, no focal neurological deficits.  Seizure precautions reviewed with patient including driving restriction.  Counseled on Keppra twice daily dosing and close outpatient Neurology follow-up.  Cleared by consultant for discharge and appears medically stable for discharge with outpatient follow-up.  Discharge plan including medication, follow-up, seizure precautions as well as strict return precautions were reviewed with patient, expressed understanding, no questions or concerns.      Discharge examination   Lying in bed, alert, oriented, no focal deficits

## 2023-03-08 NOTE — PLAN OF CARE
Problem: Seizure, Active Management  Goal: Absence of Seizure/Seizure-Related Injury  Outcome: Ongoing, Progressing

## 2023-03-08 NOTE — DISCHARGE SUMMARY
"[facility]  Discharge Summary      Patient Name: Fernanda Leos  MRN: [id number]  RENZO: [id number]  Patient Class: OP- Observation  Admission Date: 3/7/2023  Hospital Length of Stay: 0 days  Discharge Date and Time: 3/8/2023  2:58 PM  Attending Physician: Ivania att. providers found   Discharging Provider: Mariana Bazzi MD  Primary Care Provider: Primary Doctor Ivania    Primary Care Team: Networked reference to record PCT     HPI:   17-year-old  gentleman with no significant prior history who recently had episode of seizure on 02/18 requiring ER visit came back with chief complaint of another episode of seizure today morning.  Upon further asking patient mentioned that he was in his usual state of health until today morning when family noticed tonic-clonic seizure however he denies any tongue biting or frothing from mouth.  He recently underwent CT head, MRI in his neurologist was planning EEG.  Otherwise denies any fever, chills, headache, dizziness, chest pain, palpitations, nausea, vomiting, bladder or bowel symptoms, denies taking any over-the-counter supplements, herbs.  Denies any smoking, excessive alcohol consumption or recreational drug use.  He does admit stress in the school due to "Grades".     Past medical history:  Negative except mentioned in HPI  Family history:  Denies any family history of seizure  Social history:  Denies any smoking, alcohol or recreational drug use  Surgical history:  Spine surgery      * No surgery found *      Hospital Course:   Assumed care of this patient on 3/8/23.  Patient states he was sleeping, family noted seizure-like activity.  Reportedly he had brain fog/postictal state.  This is his second episode of seizure activity.  States he was being evaluated by a neurologist in [place], had CT head, MRI with no acute findings, was not on AED.  Labs on presentation with , likely due to seizure.  Repeat CT head no acute.  EEG " with no epileptiform discharges or seizure activity.  He was seen by Neurology in consultation, started on Keppra 500 mg b.i.d. He was placed in the hospital under observation status.  No further seizure activity, back to his baseline mental status, no focal neurological deficits.  Seizure precautions reviewed with patient including driving restriction.  Counseled on Keppra twice daily dosing and close outpatient Neurology follow-up.  Cleared by consultant for discharge and appears medically stable for discharge with outpatient follow-up.  Discharge plan including medication, follow-up, seizure precautions as well as strict return precautions were reviewed with patient, expressed understanding, no questions or concerns.      Discharge examination   Lying in bed, alert, oriented, no focal deficits       Goals of Care Treatment Preferences:  Code Status: Full Code      Consults:   Consults (From admission, onward)        Status Ordering Provider     Inpatient consult to Neurology  Once        Provider:  Alonzo Burnham MD    Acknowledged CHRISTOPHER FERRELL     Inpatient consult to Internal Medicine  Once        Provider:  (Not yet assigned)    Acknowledged CHRISTOPHER FERRELL     Inpatient consult to neurology  Once        Provider:  (Not yet assigned)    Acknowledged CHRISTOPHER FERRELL          No new Assessment & Plan notes have been filed under this hospital service since the last note was generated.  Service: Hospital Medicine    Final Active Diagnoses:    Diagnosis Date Noted POA    PRINCIPAL PROBLEM:  Seizure [R56.9] 03/07/2023 Yes    Adolescent idiopathic scoliosis of thoracolumbar spine [M41.125] 10/19/2018 Yes    S/P spinal fusion [Z98.1] 10/19/2018 Not Applicable      Problems Resolved During this Admission:       Discharged Condition: good    Disposition: Home or Self Care    Follow Up:   Follow-up Information     Primary care provider. Schedule an appointment as soon as possible for a visit in 1 week(s).     Why: follow up           Alonzo Burnham MD. Schedule an appointment as soon as possible for a visit in 1 week(s).    Specialties: Vascular Neurology, Neurology  Why: follow up  Contact information:  [address]  [place] [address] [phone]                       Patient Instructions:      Diet Adult Regular     No driving until:   Order Comments: Until seen and cleared by neurologist     Other restrictions (specify):   Order Comments: Seizure precautions as reviewed     Notify your health care provider if you experience any of the following:  temperature >100.4     Notify your health care provider if you experience any of the following:  severe uncontrolled pain     Notify your health care provider if you experience any of the following:  increased confusion or weakness     Notify your health care provider if you experience any of the following:  severe persistent headache     Activity as tolerated       Significant Diagnostic Studies: Labs:   BMP:   Recent Labs   Lab 03/07/23  0713 03/08/23  0402   GLU 96 108   * 139   K 3.6 3.8    107   CO2 26 27   BUN 11 11   CREATININE 1.0 0.9   CALCIUM 9.2 9.5   MG  --  2.1   , CMP   Recent Labs   Lab 03/07/23  0713 03/08/23  0402   * 139   K 3.6 3.8    107   CO2 26 27   GLU 96 108   BUN 11 11   CREATININE 1.0 0.9   CALCIUM 9.2 9.5   PROT 7.0 7.2   ALBUMIN 4.2 4.4   BILITOT 1.1* 1.0   ALKPHOS 61 73   AST 28 28   ALT 47* 45*   ANIONGAP 7* 5*   , CBC   Recent Labs   Lab 03/07/23 0713 03/08/23  0402   WBC 6.06 5.82   HGB 14.5 14.8   HCT 43.2 43.2    294   , INR No results found for: INR, PROTIME, Lipid Panel No results found for: CHOL, HDL, LDLCALC, TRIG, CHOLHDL, Troponin No results for input(s): TROPONINI in the last 168 hours. and A1C: No results for input(s): HGBA1C in the last 4320 hours.    Pending Diagnostic Studies:     None       CT Head Without Contrast    Result Date: 3/7/2023  CMS MANDATED QUALITY DATA - CT RADIATION - 436 All  CT scans at this facility utilize dose modulation, iterative reconstruction, and/or weight based dosing when appropriate to reduce radiation dose to as low as reasonably achievable. Reason: Seizure, new-onset, no history of trauma TECHNIQUE: Head CT without IV contrast. COMPARISON: CT brain February 18, 2023 FINDINGS: Gray-white differentiation is maintained without hemorrhage, midline shift, or mass effect. The ventricles and cisterns are maintained. Calvarium is intact. Right maxillary sinus mucus retention cyst versus polyp and mucoperiosteal thickening of the left frontal sinus again noted. IMPRESSION: No acute intracranial abnormality. Electronically signed by:  Rick Mills DO  3/7/2023 8:15 AM CST Workstation: [id number]    CT Head Without Contrast    Result Date: 2/18/2023  CMS MANDATED QUALITY DATA - CT RADIATION - 436 All CT scans at this facility utilize dose modulation, iterative reconstruction, and/or weight based dosing when appropriate to reduce radiation dose to as low as reasonably achievable. Reason: Seizure, new-onset, no history of trauma TECHNIQUE: Head CT without IV contrast. COMPARISON: None FINDINGS: Gray-white differentiation is maintained without hemorrhage, midline shift, or mass effect. The ventricles and cisterns are maintained. Calvarium is intact. Mucous retention cyst versus polyp of the right maxillary sinus. Opacified left frontal sinus noted. IMPRESSION: No acute intracranial abnormality. Right maxillary sinus mucus retention cyst versus polyp. Opacified left frontal sinus, possibly reflecting a mucous retention cyst or polyp. Electronically signed by:  Rick Mills DO  2/18/2023 7:45 AM CST Workstation: [id number]    X-Ray Chest AP Portable    Result Date: 3/7/2023  Reason: Seizure. FINDINGS: Portable chest without comparisons show normal cardiomediastinal silhouette. Lungs are clear. Pulmonary vasculature is normal. No acute osseous abnormality. Dextrocurvature of the  thoracic spine with Sharp rods. IMPRESSION: No acute cardiopulmonary abnormality. Electronically signed by:  Rick Mills DO  3/7/2023 8:16 AM CST Workstation: [id number]    MRI Brain Epilepsy Without Contrast    Result Date: 3/2/2023  EXAMINATION: MRI BRAIN EPILEPSY WITHOUT CONTRAST CLINICAL HISTORY: Epilepsy, unspecified, not intractable, without status epilepticus TECHNIQUE: Multiplanar multisequence MR imaging of the brain was performed without intravenous contrast. Additional thin cut images were performed through the hippocampi per epilepsy protocol. COMPARISON: CT head 02/18/2023. FINDINGS: Somewhat motion degraded exam. Intracranial Compartment: Ventricles and sulci are normal in size for age without evidence of hydrocephalus. No extra-axial blood or fluid collections. The brain parenchyma appears normal. No mass lesion, acute hemorrhage, edema, or acute infarct. Hippocampi have normal and symmetric in architecture and signal. Normal vascular flow voids are preserved. Skull/Extracranial Contents (limited evaluation): Bone marrow signal intensity is normal. Other: Similar small retention cyst within the right maxillary sinus and mild inflammatory changes within the anterior left ethmoid sinus.  Developmentally diminutive left frontal sinus is near completely opacified, also chronic.  Mastoid air cells are clear.  Visualized orbits are within normal limits.     1. Negative noncontrast MRI of the brain with special attention to the mesial temporal lobes. 2. Chronic paranasal sinus disease as above. Electronically signed by: Suleiman Quinn Date:    03/02/2023 Time:    15:56    Medications:  Reconciled Home Medications:      Medication List      START taking these medications    levETIRAcetam 500 MG Tab  Commonly known as: KEPPRA  Take 1 tablet (500 mg total) by mouth 2 (two) times daily.            Indwelling Lines/Drains at time of discharge:   Lines/Drains/Airways     None                 Time spent on the  discharge of patient: 32 minutes         Mariana Bazzi MD  Department of Hospital Medicine  [facility]

## 2023-03-08 NOTE — PROGRESS NOTES
"[facility]  Department of Neurology  Progress Note  Date: 2023 1:41 PM          Patient Name: Fernanda Leos   MRN: [id number]   : 2005    AGE: 17 y.o.    LOS: 0 days Hospital Day: 2  Admit date: 3/7/2023  6:06 AM       HPI per EMR: Fernanda Leos is a 17 y.o. male with no significant prior history who recently had episode of seizure on  requiring ER visit came back with chief complaint of another episode of seizure today morning.  Upon further asking patient mentioned that he was in his usual state of health until today morning when family noticed tonic-clonic seizure however he denies any tongue biting or frothing from mouth. He recently underwent CT head, MRI in his neurologist was planning EEG. Otherwise denies any fever, chills, headache, dizziness, chest pain, palpitations, nausea, vomiting, bladder or bowel symptoms, denies taking any over-the-counter supplements, herbs. Denies any smoking, excessive alcohol consumption or recreational drug use.  He does admit stress in the school due to "Grades".      Neurology consult: Pt seen and examined by me. In bed. Alert and oriented x3. Presented to the ED for complaints of seizure like activity. Described as convulsions that lasted for an unknown amount of time. Denies tongue biting and/ or incontinence. Post ictal state describes as fatigue and mild confusion/ brain fog. This is his second event. Currently being worked up in the clinic for seizures. Family hx of seizures (sister). Denies other events of seizure like activity including staring episodes.     2023: No acute events overnight. Patient was seen and examined by Dr. Yovani Burnham and me this morning. Neuro exam unchanged. Pt reports that he has a history of depression from when his sister passed away, but he denies current depressive episodes. Denies other seizure like activity including staring episodes. EEG negative for seizure events.        Vitals:  Patient Vitals " "for the past 24 hrs:   BP Temp Temp src Pulse Resp SpO2 Height Weight   03/08/23 1140 122/75 98 °F (36.7 °C) Oral 60 16 98 % -- --   03/08/23 0700 115/77 98.5 °F (36.9 °C) Oral 67 16 97 % -- --   03/08/23 0347 121/77 98 °F (36.7 °C) Oral 62 16 97 % -- --   03/07/23 2325 124/75 98.1 °F (36.7 °C) Oral 64 16 98 % -- --   03/07/23 2100 -- -- -- -- -- -- 5' 11" (1.803 m) 78.2 kg (172 lb 6.4 oz)   03/07/23 1922 108/63 97.8 °F (36.6 °C) Oral 68 16 96 % -- --   03/07/23 1646 125/77 98 °F (36.7 °C) Oral 80 18 (!) 3 % -- --     PHYSICAL EXAM:     GENERAL APPEARANCE: Well-developed, well-nourished male in no acute distress.  HEENT: Normocephalic and atraumatic. PERRL. Oropharynx unremarkable.  PULM: Comfortable on room air.  CV: RRR.  ABDOMEN: Soft, nontender.  EXTREMITIES: No signs of vascular compromise. Pulses present. No cyanosis, clubbing or edema.  SKIN: Clear; no rashes, lesions or skin breaks in exposed areas.      NEURO:   MENTAL STATUS: Patient awake and oriented to time, place, and person. Affect normal.  CRANIAL NERVES II-XII: Pupils equal, round and reactive to light. Extraocular movements full and intact. No facial asymmetry.  MOTOR: Normal bulk. Tone normal and symmetrical throughout.  No abnormal movements. No tremor.   Strength 5/5 throughout unless specified below.  REFLEXES: DTRs 2+; normal and symmetric throughout.   SENSATION: Sensation grossly intact to fine touch.  COORDINATION: Finger-to-nose normal for age and symmetric.  STATION: Romberg deferred.  GAIT: Deferred.    CURRENT SCHEDULED MEDICATIONS:   levETIRAcetam  500 mg Oral BID     CURRENT INFUSIONS:    DATA:  Recent Labs   Lab 03/07/23  0713 03/08/23  0402   * 139   K 3.6 3.8    107   CO2 26 27   BUN 11 11   CREATININE 1.0 0.9   GLU 96 108   CALCIUM 9.2 9.5   PHOS  --  4.2   MG  --  2.1   AST 28 28   ALT 47* 45*     Recent Labs   Lab 03/07/23  0713 03/08/23  0402   WBC 6.06 5.82   HGB 14.5 14.8   HCT 43.2 43.2    294     No " results found for: PROTEINCSF, GLUCCSF, WBCCSF, RBCCSF, PMNCSF  No results found for: HGBA1C         I have personally reviewed and interpreted the pertinent imaging and lab results.  Imaging Results              X-Ray Chest AP Portable (Final result)  Result time 03/07/23 08:16:50      Final result by Rick Mills MD (03/07/23 08:16:50)                   Narrative:    Reason: Seizure.    FINDINGS:    Portable chest without comparisons show normal cardiomediastinal silhouette.  Lungs are clear. Pulmonary vasculature is normal. No acute osseous abnormality. Dextrocurvature of the thoracic spine with Sharp rods.    IMPRESSION:    No acute cardiopulmonary abnormality.    Electronically signed by:  Rick Mills DO  3/7/2023 8:16 AM CST Workstation: [id number]                                     CT Head Without Contrast (Final result)  Result time 03/07/23 08:15:49      Final result by Rick Mills MD (03/07/23 08:15:49)                   Narrative:    CMS MANDATED QUALITY DATA - CT RADIATION - 436    All CT scans at this facility utilize dose modulation, iterative reconstruction, and/or weight based dosing when appropriate to reduce radiation dose to as low as reasonably achievable.        Reason: Seizure, new-onset, no history of trauma    TECHNIQUE: Head CT without IV contrast.    COMPARISON: CT brain February 18, 2023    FINDINGS:    Gray-white differentiation is maintained without hemorrhage, midline shift, or mass effect.    The ventricles and cisterns are maintained.    Calvarium is intact. Right maxillary sinus mucus retention cyst versus polyp and mucoperiosteal thickening of the left frontal sinus again noted.    IMPRESSION:    No acute intracranial abnormality.        Electronically signed by:  iRck Mills DO  3/7/2023 8:15 AM CST Workstation: [id number]                                            ASSESSMENT AND PLAN:     Seizures     CTH- No acute intracranial abnormality.  Previous MRI brain  wo- Negative noncontrast MRI of the brain with special attention to the mesial temporal lobes    Routine EEG- mild encephalopathy, No interictal epileptiform discharges and no seizures were recorded.     PLAN  - Continue Keppra 500mg BID   - OK with PRN ativan for seizures inpatient   - Medication s/e discussed with patient  - Seizure education and precautions discussed with patient  - Seizures precautions while inpatient   - Follow up with [facility] within 2 weeks of discharge   -No driving, climbing, swimming until 6 months seizure free        43 minutes of care time has been spent evaluating with the patient. Time includes chart review not limited to diagnostic imaging, labs, and vitals, patient assessment, discussion with family and nursing, current order evaluations, and new order entries.     Pushpa Carroll NP  Neurology/vascular Neurology  Date of Service: 03/08/2023  1:41 PM    Please note: This note was transcribed using voice recognition software. Because of this technology there are often uinintended grammatical, spelling, and other transcription errors. Please disregard these errors.

## 2023-03-13 ENCOUNTER — TELEPHONE (OUTPATIENT)
Dept: NEUROSURGERY | Facility: CLINIC | Age: 18
End: 2023-03-13
Payer: MEDICAID

## 2023-03-13 NOTE — TELEPHONE ENCOUNTER
----- Message from Stacey M Lefort sent at 3/13/2023  2:46 PM CDT -----  Mother Alexandra is requesting a callback at [phone]. Thank you.

## 2023-03-17 ENCOUNTER — OFFICE VISIT (OUTPATIENT)
Dept: NEUROLOGY | Facility: CLINIC | Age: 18
End: 2023-03-17
Payer: COMMERCIAL

## 2023-03-17 VITALS
DIASTOLIC BLOOD PRESSURE: 60 MMHG | BODY MASS INDEX: 22.85 KG/M2 | SYSTOLIC BLOOD PRESSURE: 119 MMHG | HEART RATE: 58 BPM | RESPIRATION RATE: 20 BRPM | WEIGHT: 163.25 LBS | HEIGHT: 71 IN

## 2023-03-17 DIAGNOSIS — G40.909 SEIZURE DISORDER: Primary | ICD-10-CM

## 2023-03-17 PROCEDURE — 3074F PR MOST RECENT SYSTOLIC BLOOD PRESSURE < 130 MM HG: ICD-10-PCS | Mod: CPTII,S$GLB,, | Performed by: PSYCHIATRY & NEUROLOGY

## 2023-03-17 PROCEDURE — 3078F DIAST BP <80 MM HG: CPT | Mod: CPTII,S$GLB,, | Performed by: PSYCHIATRY & NEUROLOGY

## 2023-03-17 PROCEDURE — 99215 PR OFFICE/OUTPT VISIT, EST, LEVL V, 40-54 MIN: ICD-10-PCS | Mod: S$GLB,,, | Performed by: PSYCHIATRY & NEUROLOGY

## 2023-03-17 PROCEDURE — 99999 PR PBB SHADOW E&M-EST. PATIENT-LVL III: CPT | Mod: PBBFAC,,, | Performed by: PSYCHIATRY & NEUROLOGY

## 2023-03-17 PROCEDURE — 3008F PR BODY MASS INDEX (BMI) DOCUMENTED: ICD-10-PCS | Mod: CPTII,S$GLB,, | Performed by: PSYCHIATRY & NEUROLOGY

## 2023-03-17 PROCEDURE — 99999 PR PBB SHADOW E&M-EST. PATIENT-LVL III: ICD-10-PCS | Mod: PBBFAC,,, | Performed by: PSYCHIATRY & NEUROLOGY

## 2023-03-17 PROCEDURE — 3074F SYST BP LT 130 MM HG: CPT | Mod: CPTII,S$GLB,, | Performed by: PSYCHIATRY & NEUROLOGY

## 2023-03-17 PROCEDURE — 1159F MED LIST DOCD IN RCRD: CPT | Mod: CPTII,S$GLB,, | Performed by: PSYCHIATRY & NEUROLOGY

## 2023-03-17 PROCEDURE — 3008F BODY MASS INDEX DOCD: CPT | Mod: CPTII,S$GLB,, | Performed by: PSYCHIATRY & NEUROLOGY

## 2023-03-17 PROCEDURE — 3078F PR MOST RECENT DIASTOLIC BLOOD PRESSURE < 80 MM HG: ICD-10-PCS | Mod: CPTII,S$GLB,, | Performed by: PSYCHIATRY & NEUROLOGY

## 2023-03-17 PROCEDURE — 1159F PR MEDICATION LIST DOCUMENTED IN MEDICAL RECORD: ICD-10-PCS | Mod: CPTII,S$GLB,, | Performed by: PSYCHIATRY & NEUROLOGY

## 2023-03-17 PROCEDURE — 99215 OFFICE O/P EST HI 40 MIN: CPT | Mod: S$GLB,,, | Performed by: PSYCHIATRY & NEUROLOGY

## 2023-03-17 RX ORDER — LACOSAMIDE 100 MG/1
100 TABLET ORAL 2 TIMES DAILY
Qty: 60 TABLET | Refills: 11 | Status: SHIPPED | OUTPATIENT
Start: 2023-03-17 | End: 2023-04-12 | Stop reason: SDUPTHER

## 2023-03-17 RX ORDER — LACOSAMIDE 100 MG/1
100 TABLET ORAL 2 TIMES DAILY
Qty: 60 TABLET | Refills: 11 | Status: SHIPPED | OUTPATIENT
Start: 2023-03-17 | End: 2023-03-17 | Stop reason: SDUPTHER

## 2023-03-17 RX ORDER — SELENIUM SULFIDE 22.5 MG/ML
500 SHAMPOO TOPICAL 2 TIMES DAILY
COMMUNITY
Start: 2023-03-10 | End: 2023-04-12 | Stop reason: SDUPTHER

## 2023-03-17 NOTE — PROGRESS NOTES
"Date of service:  03/17/2023     Chief complaint:  Seizures    History of present illness:  The patient is a 17 y.o. male we have been asked to see for evaluation of episodes suspicious for seizures. These began in 2/23.  The history associated with this event is substantially similar to that described by Dr. Narayanan as below.  The event occurred out of sleep, when his mother heard loud banging.  Consequently, the patient's mother did not see the beginning of the event.  The patient was evaluated, and a decision was made to hold off on starting any AED at that time.  A couple of weeks later (3/7/23), he had a second event.  They describe it in a manner similar to the first.  He was started on Keppra 500 mg BID at that point.  He has had no further seizures on this mediation; however, his mother does endorse some irritability on this drug.    HPI from initial consultation by Dr. Narayanan (2/23):  "Mr. Fernanda Leos is a 17 y.o. male with past history of ADHD and scoliosis (status post corrective surgery, fusion, T4-L4 instrumentation) presenting to discuss 1st ever seizure event.  Patient was at home on 02/18/23 when family member heard loud noise and went into his room.  Patient was observed with limbs extended and strange look on his face.  Foaming at the mouth jerking movements and irregular breathing.  No tongue biting.  No bowel/bladder incontinence. (+) postictal state for 15+ minutes.  Transported by ambulance to emergency department for evaluation where UDS, Mg, CBC, CMP, UA were largely unrevealing.  No further episodes since that time.  No family history of seizures.  No personal history of seizures.  No history of head trauma or neurological infections.  No history of significant childhood illness.  Patient takes no daily medications.  Denied poor sleep, drug/alcohol consumption, or exposure to sick in the 72 hrs before event."    Potential Epilepsy Risk Factors:   Pregnancy/Labor/Delivery - none  Febrile " seizures - none  Head injury  - none  CNS infection - none     Stroke - none  Family Hx of Sz - + sister in the setting of meningoencephalitis     AED Treatments  Present regimen  Keppra 500 mg BID    Prior treatments  NA    Not tried  acetazolamide (Diamox, AZM)  Amantadine  brivitiracetam (Briviact, BRV)  cenobamate (Xcopri)  carbamazepine (Tegretol, CBZ)  clobazam (Onfi or Frizium, CLB)  ethosuximide (Zarontin, ESM)  eslicarbazine (Aptiom, ESL)  felbamate (felbatol, FBM)  gabapentin (Neurontin, GPN)  lacosamide (Vimpat, LCS)   lamotrigine (Lamictal, LTG)   methsuximide (Celontin, MSM)  oxcarbazepine (Trileptal OXC)  perampanel (Fycompa, FCP)   phenobarbital (Pb)  phenytoin (Dilantin, PHT)  pregabalin (Lyrica, PGB)  primidone (Mysoline, PRM)  rufinamide (Banzel, RUF)  tiagabine (Gabatril,  TGB)  topiramate (Topamax, TPM)  viagabatrin, (Sabril, VGB)  vagal nerve stimulator (VNS)  valproic acid (Depakote, VPA)  zonisamide (Zonegran, ZNA)  Benzodiazepines  diazepam - rectal (Diastatl)  diazepam - oral (Valium, DZ)  clonazepam (Klonopin, CZP)  clorazepate (Tranxene, CLZ)  Ativan  Brain Stimulation  Vagal Nerve Stimulation-n/a  DBS- n/a    Past Medical History:   Diagnosis Date    ADHD (attention deficit hyperactivity disorder)     Scoliosis     Seizure        Past Surgical History:   Procedure Laterality Date    SPINE SURGERY         Family History   Problem Relation Age of Onset    Hypertension Maternal Grandmother     Cancer Maternal Grandfather     Colon cancer Maternal Grandfather     Heart disease Paternal Grandfather     Heart attack Paternal Grandfather        Social History     Socioeconomic History    Marital status: Single   Tobacco Use    Smoking status: Never   Substance and Sexual Activity    Alcohol use: No    Drug use: No    Sexual activity: Never        Review of patient's allergies indicates:   Allergen Reactions    Benadryl decongestant      Lowers the seizure threshold       Quinolones      Lowers  "the seizure threshold       Tramadol      Lowers the seizure threshold       Wellbutrin [bupropion hcl]      Lowers the seizure threshold         Review of Systems  The patient's ROS is noncontributory except as noted above.     Physical exam:  /60   Pulse (!) 58   Resp 20   Ht 5' 11" (1.803 m)   Wt 74 kg (163 lb 4 oz)   BMI 22.77 kg/m²   General: Well developed, well nourished.  No acute distress.  ENT: Mucus membranes moist.  Atraumatic external nose and ears.  Lymphatic: No apparent lymphadenopathy.  Cardiovascular: Regular rate and rhythm.  Pulmonary: No increased work of breathing.  Abdomen/GI: No guarding.  Musculoskeletal: No clubbing or cyanosis.    Neurological exam:  Mental status: Awake and alert.  Oriented x4.  Speech fluent and appropriate.  Recent and remote memory appear to be intact.  Fund of knowledge normal.  Cranial nerves: Pupils equal round and reactive to light, extraocular movements intact, facial strength and sensation intact bilaterally, palate and tongue midline, hearing grossly intact bilaterally.  Motor: 5 out of 5 strength throughout the upper and lower extremities bilaterally. Normal bulk and tone.  Sensation: Intact to light touch and temperature bilaterally.  DTR: 2+ at the knees and biceps bilaterally.  Coordination: Finger-nose-finger testing intact bilaterally.  Gait: Normal gait. Good tandem.    Data base:  Notes from Dr. Narayanan and [facility] were reviewed and as summarized above.    Labs (3/23):  CMP: includes ALT=45  CK: 567    MRI brain (3/23):  "1. Negative noncontrast MRI of the brain with special attention to the mesial temporal lobes.  2. Chronic paranasal sinus disease as above."    EEG (external report by Dr. Yovani Burnham, 3/23):  "IMPRESSION: Abnormal routine EEG study indicating a mild encephalopathy. No interictal epileptiform discharges and no seizures were recorded."    Assessment and plan:  The patient is a 17 y.o. male we have been asked to see for " evaluation for events worrisome for seizures. Lateralization/localization and etiology are presently unclear. I think a reasonable workup at this point would include repeat EEG.  As far as medications go, we will try the patient on Vimpat as he is having side effects from Keppra. Medication side effects were discussed with the patient.  State law as it pertains to driving for individuals with seizures was discussed.  The patient was also counseled on seizure safety. We will plan on seeing the patient back in a few months.

## 2023-04-12 ENCOUNTER — OFFICE VISIT (OUTPATIENT)
Dept: FAMILY MEDICINE | Facility: CLINIC | Age: 18
End: 2023-04-12
Payer: MEDICAID

## 2023-04-12 VITALS
WEIGHT: 177 LBS | DIASTOLIC BLOOD PRESSURE: 56 MMHG | SYSTOLIC BLOOD PRESSURE: 92 MMHG | TEMPERATURE: 98 F | HEART RATE: 82 BPM | HEIGHT: 71 IN | BODY MASS INDEX: 24.78 KG/M2 | OXYGEN SATURATION: 95 %

## 2023-04-12 DIAGNOSIS — G40.909 SEIZURE DISORDER: ICD-10-CM

## 2023-04-12 PROCEDURE — 99202 PR OFFICE/OUTPT VISIT, NEW, LEVL II, 15-29 MIN: ICD-10-PCS | Mod: S$GLB,,, | Performed by: INTERNAL MEDICINE

## 2023-04-12 PROCEDURE — 3074F PR MOST RECENT SYSTOLIC BLOOD PRESSURE < 130 MM HG: ICD-10-PCS | Mod: CPTII,S$GLB,, | Performed by: INTERNAL MEDICINE

## 2023-04-12 PROCEDURE — 3008F PR BODY MASS INDEX (BMI) DOCUMENTED: ICD-10-PCS | Mod: CPTII,S$GLB,, | Performed by: INTERNAL MEDICINE

## 2023-04-12 PROCEDURE — 1159F MED LIST DOCD IN RCRD: CPT | Mod: CPTII,S$GLB,, | Performed by: INTERNAL MEDICINE

## 2023-04-12 PROCEDURE — 99202 OFFICE O/P NEW SF 15 MIN: CPT | Mod: S$GLB,,, | Performed by: INTERNAL MEDICINE

## 2023-04-12 PROCEDURE — 3078F DIAST BP <80 MM HG: CPT | Mod: CPTII,S$GLB,, | Performed by: INTERNAL MEDICINE

## 2023-04-12 PROCEDURE — 1160F PR REVIEW ALL MEDS BY PRESCRIBER/CLIN PHARMACIST DOCUMENTED: ICD-10-PCS | Mod: CPTII,S$GLB,, | Performed by: INTERNAL MEDICINE

## 2023-04-12 PROCEDURE — 3078F PR MOST RECENT DIASTOLIC BLOOD PRESSURE < 80 MM HG: ICD-10-PCS | Mod: CPTII,S$GLB,, | Performed by: INTERNAL MEDICINE

## 2023-04-12 PROCEDURE — 3008F BODY MASS INDEX DOCD: CPT | Mod: CPTII,S$GLB,, | Performed by: INTERNAL MEDICINE

## 2023-04-12 PROCEDURE — 1159F PR MEDICATION LIST DOCUMENTED IN MEDICAL RECORD: ICD-10-PCS | Mod: CPTII,S$GLB,, | Performed by: INTERNAL MEDICINE

## 2023-04-12 PROCEDURE — 1160F RVW MEDS BY RX/DR IN RCRD: CPT | Mod: CPTII,S$GLB,, | Performed by: INTERNAL MEDICINE

## 2023-04-12 PROCEDURE — 3074F SYST BP LT 130 MM HG: CPT | Mod: CPTII,S$GLB,, | Performed by: INTERNAL MEDICINE

## 2023-04-12 RX ORDER — SELENIUM SULFIDE 22.5 MG/ML
500 SHAMPOO TOPICAL 2 TIMES DAILY
Qty: 180 ML | Refills: 6 | Status: SHIPPED | OUTPATIENT
Start: 2023-04-12

## 2023-04-12 RX ORDER — LACOSAMIDE 100 MG/1
100 TABLET ORAL 2 TIMES DAILY
Qty: 60 TABLET | Refills: 3 | Status: SHIPPED | OUTPATIENT
Start: 2023-04-12

## 2023-04-12 NOTE — PROGRESS NOTES
Subjective:       Patient ID: Fernanda Leos is a 17 y.o. male.    Chief Complaint: Establish Care (New patient establishment) and Referral (Requesting a neurology referral for seizures)    Here to establish care requesting referral to Neurology.   He suffered a seizure about 6 weeks ago and had a negative workup in ER and was discharged with plans for outpatient Neurology follow up.  He was not placed on meds at that time.  He subsequently had another seizure a few weeks later and was admitted overnight; discharged on Keppra.  However, he had some anger issues with Keppra so when he was seen by Neuro (Dr. Rivero) he was switched to Vimpat.   He is tolerating it fine and has not had any further seizures thus far.   He is requesting referral to a new Neurologist because Dr. Rivero told him that he would be moving soon and also prefers not to drive to [place].   Mom has spoken to someone they want to see but is unsure of the name.      Review of Systems   Constitutional:  Negative for activity change, appetite change, chills, diaphoresis, fatigue, fever and unexpected weight change.   HENT:  Negative for congestion, ear discharge, ear pain, hearing loss, nosebleeds, postnasal drip, rhinorrhea, sinus pressure, sinus pain, sneezing, sore throat, tinnitus, trouble swallowing and voice change.    Eyes:  Negative for photophobia, pain, discharge, redness, itching and visual disturbance.   Respiratory:  Negative for apnea, cough, choking, chest tightness, shortness of breath and wheezing.    Cardiovascular:  Negative for chest pain, palpitations and leg swelling.   Gastrointestinal:  Negative for abdominal distention, abdominal pain, blood in stool, constipation, diarrhea, nausea and vomiting.   Endocrine: Negative for cold intolerance, heat intolerance, polydipsia and polyuria.   Genitourinary:  Negative for decreased urine volume, difficulty urinating, dysuria, enuresis, flank pain, frequency, genital sores, hematuria,  penile discharge, penile pain, scrotal swelling, testicular pain and urgency.   Musculoskeletal:  Negative for arthralgias, back pain, gait problem, joint swelling, myalgias, neck pain and neck stiffness.   Skin:  Negative for rash and wound.   Allergic/Immunologic: Negative for environmental allergies, food allergies and immunocompromised state.   Neurological:  Positive for seizures. Negative for dizziness, tremors, syncope, facial asymmetry, speech difficulty, weakness, light-headedness, numbness and headaches.   Hematological:  Negative for adenopathy. Does not bruise/bleed easily.   Psychiatric/Behavioral:  Negative for confusion, decreased concentration, hallucinations, self-injury, sleep disturbance and suicidal ideas. The patient is not nervous/anxious.      Past Medical History:   Diagnosis Date    ADHD (attention deficit hyperactivity disorder)     Scoliosis     Seizure       Past Surgical History:   Procedure Laterality Date    SPINE SURGERY      scoliosis repair       Family History   Problem Relation Age of Onset    No Known Problems Mother     Hypertension Father     Hypertension Maternal Grandmother     Cancer Maternal Grandfather     Colon cancer Maternal Grandfather     Heart disease Paternal Grandfather     Heart attack Paternal Grandfather        Social History     Socioeconomic History    Marital status: Single   Occupational History    Occupation: student   Tobacco Use    Smoking status: Never    Smokeless tobacco: Never   Substance and Sexual Activity    Alcohol use: No    Drug use: No    Sexual activity: Never   Social History Narrative    Live with mother       Current Outpatient Medications   Medication Sig Dispense Refill    lacosamide (VIMPAT) 100 mg Tab Take 1 tablet (100 mg total) by mouth 2 (two) times daily. 60 tablet 3    selenium sulfide 2.25 % Sham Take 500 mg by mouth 2 (two) times a day. 180 mL 6     No current facility-administered medications for this visit.       Review of  "patient's allergies indicates:   Allergen Reactions    Benadryl decongestant      Lowers the seizure threshold       Quinolones      Lowers the seizure threshold       Tramadol      Lowers the seizure threshold       Wellbutrin [bupropion hcl]      Lowers the seizure threshold      Objective:    HPI     Establish Care     Additional comments: New patient establishment           Referral     Additional comments: Requesting a neurology referral for seizures          Last edited by Benny Riggins MA on 4/12/2023  2:22 PM.      Blood pressure (!) 92/56, pulse 82, temperature 98.2 °F (36.8 °C), temperature source Temporal, height 5' 11" (1.803 m), weight 80.3 kg (177 lb), SpO2 95 %. Body mass index is 24.69 kg/m².   Physical Exam  Vitals and nursing note reviewed.   Constitutional:       General: He is not in acute distress.     Appearance: He is well-developed. He is not ill-appearing, toxic-appearing or diaphoretic.   HENT:      Head: Normocephalic and atraumatic.   Eyes:      General: No scleral icterus.        Right eye: No discharge.         Left eye: No discharge.      Conjunctiva/sclera: Conjunctivae normal.   Neck:      Vascular: No carotid bruit.   Cardiovascular:      Rate and Rhythm: Normal rate and regular rhythm.      Heart sounds: Normal heart sounds. No murmur heard.  Pulmonary:      Effort: Pulmonary effort is normal. No respiratory distress.      Breath sounds: Normal breath sounds. No decreased breath sounds, wheezing, rhonchi or rales.   Abdominal:      General: There is no distension.      Palpations: Abdomen is soft.      Tenderness: There is no abdominal tenderness. There is no guarding or rebound.   Musculoskeletal:      Right lower leg: No edema.      Left lower leg: No edema.   Skin:     General: Skin is warm and dry.   Neurological:      Mental Status: He is alert.      Motor: No tremor.   Psychiatric:         Mood and Affect: Mood normal.         Speech: Speech normal.         Behavior: " Behavior normal.           Assessment:       1. Seizure disorder        Plan:       Fernanda was seen today for establish care and referral.    Diagnoses and all orders for this visit:    Seizure disorder  Comments:  Dr. Yovani Burnham is the only Neuro I am aware of that accepts Medicaid but call back if there is someone different they want to see.  Orders:  -     lacosamide (VIMPAT) 100 mg Tab; Take 1 tablet (100 mg total) by mouth 2 (two) times daily.  -     Ambulatory referral/consult to Neurology; Future    Other orders  -     selenium sulfide 2.25 % Sham; Take 500 mg by mouth 2 (two) times a day.